# Patient Record
Sex: FEMALE | Race: WHITE | Employment: FULL TIME | URBAN - METROPOLITAN AREA
[De-identification: names, ages, dates, MRNs, and addresses within clinical notes are randomized per-mention and may not be internally consistent; named-entity substitution may affect disease eponyms.]

---

## 2018-02-27 ENCOUNTER — OFFICE VISIT (OUTPATIENT)
Dept: FAMILY MEDICINE CLINIC | Age: 58
End: 2018-02-27
Payer: COMMERCIAL

## 2018-02-27 VITALS
WEIGHT: 174 LBS | OXYGEN SATURATION: 98 % | DIASTOLIC BLOOD PRESSURE: 80 MMHG | HEART RATE: 87 BPM | BODY MASS INDEX: 36.53 KG/M2 | TEMPERATURE: 98.6 F | SYSTOLIC BLOOD PRESSURE: 130 MMHG | HEIGHT: 58 IN | RESPIRATION RATE: 16 BRPM

## 2018-02-27 DIAGNOSIS — M79.7 FIBROMYALGIA: ICD-10-CM

## 2018-02-27 DIAGNOSIS — Z86.39 HISTORY OF VITAMIN D DEFICIENCY: ICD-10-CM

## 2018-02-27 DIAGNOSIS — Z72.89 OTHER PROBLEMS RELATED TO LIFESTYLE: ICD-10-CM

## 2018-02-27 DIAGNOSIS — M79.642 HAND PAIN, LEFT: Primary | ICD-10-CM

## 2018-02-27 DIAGNOSIS — Z12.11 SCREENING FOR COLON CANCER: ICD-10-CM

## 2018-02-27 DIAGNOSIS — G90.50 RSD (REFLEX SYMPATHETIC DYSTROPHY): ICD-10-CM

## 2018-02-27 DIAGNOSIS — Z12.31 SCREENING MAMMOGRAM, ENCOUNTER FOR: ICD-10-CM

## 2018-02-27 DIAGNOSIS — Z13.1 ENCOUNTER FOR SCREENING FOR DIABETES MELLITUS: ICD-10-CM

## 2018-02-27 LAB
ALBUMIN SERPL-MCNC: 4.3 G/DL (ref 3.9–4.9)
ALP BLD-CCNC: 106 U/L (ref 40–130)
ALT SERPL-CCNC: 13 U/L (ref 0–33)
ANION GAP SERPL CALCULATED.3IONS-SCNC: 16 MEQ/L (ref 7–13)
AST SERPL-CCNC: 29 U/L (ref 0–35)
BASOPHILS ABSOLUTE: 0.1 K/UL (ref 0–0.2)
BASOPHILS RELATIVE PERCENT: 0.8 %
BILIRUB SERPL-MCNC: 0.5 MG/DL (ref 0–1.2)
BUN BLDV-MCNC: 10 MG/DL (ref 6–20)
CALCIUM SERPL-MCNC: 9.4 MG/DL (ref 8.6–10.2)
CHLORIDE BLD-SCNC: 100 MEQ/L (ref 98–107)
CHOLESTEROL, TOTAL: 177 MG/DL (ref 0–199)
CO2: 25 MEQ/L (ref 22–29)
CREAT SERPL-MCNC: 0.65 MG/DL (ref 0.5–0.9)
EOSINOPHILS ABSOLUTE: 0.2 K/UL (ref 0–0.7)
EOSINOPHILS RELATIVE PERCENT: 1.7 %
FOLATE: 14.4 NG/ML (ref 7.3–26.1)
GFR AFRICAN AMERICAN: >60
GFR NON-AFRICAN AMERICAN: >60
GLOBULIN: 2.5 G/DL (ref 2.3–3.5)
GLUCOSE BLD-MCNC: 94 MG/DL (ref 74–109)
HCT VFR BLD CALC: 42.6 % (ref 37–47)
HDLC SERPL-MCNC: 40 MG/DL (ref 40–59)
HEMOGLOBIN: 14.5 G/DL (ref 12–16)
LDL CHOLESTEROL CALCULATED: 78 MG/DL (ref 0–129)
LYMPHOCYTES ABSOLUTE: 2.5 K/UL (ref 1–4.8)
LYMPHOCYTES RELATIVE PERCENT: 26.4 %
MCH RBC QN AUTO: 32.9 PG (ref 27–31.3)
MCHC RBC AUTO-ENTMCNC: 34 % (ref 33–37)
MCV RBC AUTO: 96.7 FL (ref 82–100)
MONOCYTES ABSOLUTE: 0.5 K/UL (ref 0.2–0.8)
MONOCYTES RELATIVE PERCENT: 5.1 %
NEUTROPHILS ABSOLUTE: 6.3 K/UL (ref 1.4–6.5)
NEUTROPHILS RELATIVE PERCENT: 66 %
PDW BLD-RTO: 12.8 % (ref 11.5–14.5)
PLATELET # BLD: 207 K/UL (ref 130–400)
POTASSIUM SERPL-SCNC: 4.1 MEQ/L (ref 3.5–5.1)
RBC # BLD: 4.41 M/UL (ref 4.2–5.4)
SODIUM BLD-SCNC: 141 MEQ/L (ref 132–144)
TOTAL PROTEIN: 6.8 G/DL (ref 6.4–8.1)
TRIGL SERPL-MCNC: 294 MG/DL (ref 0–200)
TSH REFLEX: 1.27 UIU/ML (ref 0.27–4.2)
VITAMIN B-12: 451 PG/ML (ref 232–1245)
WBC # BLD: 9.6 K/UL (ref 4.8–10.8)

## 2018-02-27 PROCEDURE — 99203 OFFICE O/P NEW LOW 30 MIN: CPT | Performed by: FAMILY MEDICINE

## 2018-02-27 RX ORDER — DULOXETIN HYDROCHLORIDE 30 MG/1
30 CAPSULE, DELAYED RELEASE ORAL DAILY
Qty: 30 CAPSULE | Refills: 3 | Status: SHIPPED | OUTPATIENT
Start: 2018-02-27 | End: 2018-08-16 | Stop reason: ALTCHOICE

## 2018-02-27 RX ORDER — BUDESONIDE AND FORMOTEROL FUMARATE DIHYDRATE 160; 4.5 UG/1; UG/1
AEROSOL RESPIRATORY (INHALATION)
Refills: 0 | COMMUNITY
Start: 2018-02-01 | End: 2018-02-27 | Stop reason: ALTCHOICE

## 2018-02-27 ASSESSMENT — PATIENT HEALTH QUESTIONNAIRE - PHQ9
1. LITTLE INTEREST OR PLEASURE IN DOING THINGS: 0
2. FEELING DOWN, DEPRESSED OR HOPELESS: 0
SUM OF ALL RESPONSES TO PHQ QUESTIONS 1-9: 0
SUM OF ALL RESPONSES TO PHQ9 QUESTIONS 1 & 2: 0

## 2018-02-27 ASSESSMENT — ENCOUNTER SYMPTOMS
EYES NEGATIVE: 1
GASTROINTESTINAL NEGATIVE: 1
ALLERGIC/IMMUNOLOGIC NEGATIVE: 1
RESPIRATORY NEGATIVE: 1

## 2018-02-27 NOTE — PROGRESS NOTES
hand noted  Trophic changes. Also noted, but subtle. Only noted when compared to right hand. Lymphadenopathy:     She has no cervical adenopathy. She has no axillary adenopathy. Neurological: She is alert. She has normal strength. No cranial nerve deficit or sensory deficit. Coordination and gait normal.   Skin: Skin is warm, dry and intact. No rash noted. No erythema. Psychiatric: She has a normal mood and affect. Her speech is normal. Judgment and thought content normal. Cognition and memory are normal.            Assessment & Plan   1. Hand pain, left  XR HAND LEFT (MIN 3 VIEWS)   2. RSD (reflex sympathetic dystrophy)  CBC Auto Differential    Comprehensive Metabolic Panel    Lipid Panel    TSH ULTRASENSITIVE, DIRECTED    Vitamin B12 & Folate    DULoxetine (CYMBALTA) 30 MG extended release capsule   3. Fibromyalgia  Vitamin D 25 Hydrox, D2 & D3    Vitamin B12 & Folate    DULoxetine (CYMBALTA) 30 MG extended release capsule   4. Screening for colon cancer  [address] [place] [address] [facility], [address] [place] [address] Gastroenterology    POCT FECAL IMMUNOCHEMICAL TEST (FIT)   5. Other problems related to lifestyle  HIV-1,2 Combo Ag/Ab EIA with Reflex    Hepatitis C Antibody    CBC Auto Differential    Comprehensive Metabolic Panel    Lipid Panel    TSH ULTRASENSITIVE, DIRECTED   6. Encounter for screening for diabetes mellitus  Glucose, Fasting   7. Screening mammogram, encounter for  [facility] DIGITAL SCREEN W CAD BILATERAL   8.  History of vitamin D deficiency  Vitamin D 25 Hydrox, D2 & D3     Orders Placed This Encounter   Procedures    [facility] DIGITAL SCREEN W CAD BILATERAL     Standing Status:   Future     Standing Expiration Date:   4/29/2019    XR HAND LEFT (MIN 3 VIEWS)     Standing Status:   Future     Number of Occurrences:   1     Standing Expiration Date:   4/27/2018    Glucose, Fasting     Standing Status:   Future     Standing Expiration Date:   2/27/2019    HIV-1,2 Combo Ag/Ab EIA with Reflex     Standing completed    SYMBICORT 160-4.5 MCG/ACT AERO Therapy completed    VENTOLIN  (90 Base) MCG/ACT inhaler Therapy completed       Ready to quit: No  Counseling given: Yes      Return in about 1 month (around 3/27/2018).     Erlin Garcia DO

## 2018-03-03 LAB
VITAMIN D2 AND D3, TOTAL: 25.4 NG/ML (ref 30–80)
VITAMIN D2, 25 HYDROXY: 15.2 NG/ML
VITAMIN D3,25 HYDROXY: 10.2 NG/ML

## 2018-08-16 ENCOUNTER — OFFICE VISIT (OUTPATIENT)
Dept: FAMILY MEDICINE CLINIC | Age: 58
End: 2018-08-16
Payer: COMMERCIAL

## 2018-08-16 VITALS
OXYGEN SATURATION: 98 % | WEIGHT: 167 LBS | SYSTOLIC BLOOD PRESSURE: 120 MMHG | BODY MASS INDEX: 35.05 KG/M2 | HEIGHT: 58 IN | TEMPERATURE: 98.6 F | HEART RATE: 98 BPM | DIASTOLIC BLOOD PRESSURE: 84 MMHG | RESPIRATION RATE: 16 BRPM

## 2018-08-16 DIAGNOSIS — Z12.11 COLON CANCER SCREENING: ICD-10-CM

## 2018-08-16 DIAGNOSIS — Z12.31 VISIT FOR SCREENING MAMMOGRAM: ICD-10-CM

## 2018-08-16 DIAGNOSIS — S20.212D CONTUSION OF RIB ON LEFT SIDE, SUBSEQUENT ENCOUNTER: Primary | ICD-10-CM

## 2018-08-16 DIAGNOSIS — F17.200 TOBACCO USE DISORDER: ICD-10-CM

## 2018-08-16 PROCEDURE — 99214 OFFICE O/P EST MOD 30 MIN: CPT | Performed by: FAMILY MEDICINE

## 2018-08-16 ASSESSMENT — ENCOUNTER SYMPTOMS
GASTROINTESTINAL NEGATIVE: 1
RESPIRATORY NEGATIVE: 1
EYES NEGATIVE: 1
ALLERGIC/IMMUNOLOGIC NEGATIVE: 1

## 2018-08-16 NOTE — PROGRESS NOTES
back: Normal.        Thoracic back: Normal.        Lumbar back: Normal.        Left hand: She exhibits tenderness. Deformity: arthritic. Normal sensation noted. Decreased strength noted. Hypesthesia left hand noted  Trophic changes unchanged   Lymphadenopathy:     She has no cervical adenopathy. She has no axillary adenopathy. Neurological: She is alert. She has normal strength. No cranial nerve deficit or sensory deficit. Coordination and gait normal.   Skin: Skin is warm, dry and intact. No rash noted. No erythema. Psychiatric: She has a normal mood and affect. Her speech is normal. Judgment and thought content normal. Cognition and memory are normal.            Assessment & Plan    Diagnosis Orders   1. Contusion of rib on left side, subsequent encounter  XR RIBS LEFT INCLUDE CHEST (MIN 3 VIEWS)   2. Tobacco use disorder     3. Visit for screening mammogram  [facility] DIGITAL SCREEN W CAD BILATERAL   4. Colon cancer screening  Ambulatory referral to Gastroenterology     Orders Placed This Encounter   Procedures    [facility]I DIGITAL SCREEN W CAD BILATERAL     Standing Status:   Future     Standing Expiration Date:   10/16/2019    XR RIBS LEFT INCLUDE CHEST (MIN 3 VIEWS)     Standing Status:   Future     Number of Occurrences:   1     Standing Expiration Date:   8/16/2019    Ambulatory referral to Gastroenterology     Referral Priority:   Routine     Referral Type:   Consult for Advice and Opinion     Referral Reason:   Specialty Services Required     Referred to Provider:   Benn Paget, MD     Requested Specialty:   Gastroenterology     Number of Visits Requested:   1     No orders of the defined types were placed in this encounter. Medications Discontinued During This Encounter   Medication Reason    DULoxetine (CYMBALTA) 30 MG extended release capsule Therapy completed    aspirin 81 MG tablet Therapy completed   smoking cessation discussed  Stretching exercises for chest wall reviewed.  At this point would not recommend any further narcotic therapy. May use OTC anti-inflammatories, but would really recommend topical BenGay , Aspercreme, Biofreeze, etc. 4 times daily as needed along with daily stretching exercises until symptomatology resolved  Ready to quit: Not Answered  Counseling given: Yes      Return if symptoms worsen or fail to improve.     Trino Buckley, DO

## 2018-08-17 ENCOUNTER — TELEPHONE (OUTPATIENT)
Dept: FAMILY MEDICINE CLINIC | Age: 58
End: 2018-08-17

## 2019-04-16 ENCOUNTER — TELEPHONE (OUTPATIENT)
Dept: FAMILY MEDICINE CLINIC | Age: 59
End: 2019-04-16

## 2019-09-18 ENCOUNTER — OFFICE VISIT (OUTPATIENT)
Dept: FAMILY MEDICINE CLINIC | Age: 59
End: 2019-09-18
Payer: COMMERCIAL

## 2019-09-18 VITALS
SYSTOLIC BLOOD PRESSURE: 128 MMHG | HEART RATE: 91 BPM | BODY MASS INDEX: 38.46 KG/M2 | HEIGHT: 56 IN | DIASTOLIC BLOOD PRESSURE: 72 MMHG | TEMPERATURE: 98.2 F | OXYGEN SATURATION: 97 % | RESPIRATION RATE: 14 BRPM | WEIGHT: 171 LBS

## 2019-09-18 DIAGNOSIS — G90.50 RSD (REFLEX SYMPATHETIC DYSTROPHY): ICD-10-CM

## 2019-09-18 DIAGNOSIS — G90.50 RSD (REFLEX SYMPATHETIC DYSTROPHY): Primary | ICD-10-CM

## 2019-09-18 PROCEDURE — 99213 OFFICE O/P EST LOW 20 MIN: CPT | Performed by: FAMILY MEDICINE

## 2019-09-18 RX ORDER — AZITHROMYCIN 250 MG/1
250 TABLET, FILM COATED ORAL SEE ADMIN INSTRUCTIONS
Qty: 6 TABLET | Refills: 0 | Status: SHIPPED | OUTPATIENT
Start: 2019-09-18 | End: 2019-09-23

## 2019-09-18 RX ORDER — TRAMADOL HYDROCHLORIDE 100 MG/1
100 TABLET, EXTENDED RELEASE ORAL DAILY
Qty: 30 TABLET | Refills: 0 | Status: SHIPPED | OUTPATIENT
Start: 2019-09-18 | End: 2019-10-09 | Stop reason: SDUPTHER

## 2019-09-18 ASSESSMENT — ENCOUNTER SYMPTOMS
RESPIRATORY NEGATIVE: 1
CHEST TIGHTNESS: 0
RHINORRHEA: 0
COUGH: 0
EYES NEGATIVE: 1
GASTROINTESTINAL NEGATIVE: 1

## 2019-09-18 ASSESSMENT — PATIENT HEALTH QUESTIONNAIRE - PHQ9
1. LITTLE INTEREST OR PLEASURE IN DOING THINGS: 0
SUM OF ALL RESPONSES TO PHQ9 QUESTIONS 1 & 2: 0
SUM OF ALL RESPONSES TO PHQ QUESTIONS 1-9: 0
SUM OF ALL RESPONSES TO PHQ QUESTIONS 1-9: 0
2. FEELING DOWN, DEPRESSED OR HOPELESS: 0

## 2019-09-18 NOTE — PROGRESS NOTES
Patient is seen in follow up for   Chief Complaint   Patient presents with   Chase Godfrey Doctor     Patient here to establish care. Last PCP Dr. Kasie Angeles of left hand pain , x 3 years  Fracture in 2016 with air conditioner fell on it.  Health Maintenance     would like to hold off on all HM    Cough     x 4 days, productive cough with clearish yellow phelgm. HPIhere for follow up on chronic hand pain and rsd. Past Medical History:   Diagnosis Date    Asthma     Fibromyalgia     Hand fracture, left 2016    RSD (reflex sympathetic dystrophy)     Tobacco abuse      Patient Active Problem List    Diagnosis Date Noted    History of vitamin D deficiency 02/27/2018    RSD (reflex sympathetic dystrophy)     Fibromyalgia     Asthma     Tobacco use disorder      History reviewed. No pertinent surgical history. Family History   Problem Relation Age of Onset    Heart Disease Father     Cancer Father         melenoma    Cancer Mother         lung     Social History     Socioeconomic History    Marital status:      Spouse name: None    Number of children: None    Years of education: None    Highest education level: None   Occupational History    None   Social Needs    Financial resource strain: None    Food insecurity:     Worry: None     Inability: None    Transportation needs:     Medical: None     Non-medical: None   Tobacco Use    Smoking status: Current Every Day Smoker     Packs/day: 1.00     Years: 41.00     Pack years: 41.00     Types: Cigarettes     Start date: 2/27/1978    Smokeless tobacco: Never Used   Substance and Sexual Activity    Alcohol use:  Yes    Drug use: No    Sexual activity: None   Lifestyle    Physical activity:     Days per week: None     Minutes per session: None    Stress: None   Relationships    Social connections:     Talks on phone: None     Gets together: None     Attends [employer] service: None     Active member

## 2019-09-21 LAB
6-ACETYLMORPHINE: NOT DETECTED
7-AMINOCLONAZEPAM: NOT DETECTED
ALPHA-OH-ALPRAZOLAM: NOT DETECTED
ALPRAZOLAM: NOT DETECTED
AMPHETAMINE: NOT DETECTED
BARBITURATES: NOT DETECTED
BENZOYLECGONINE: NOT DETECTED
BUPRENORPHINE: NOT DETECTED
CARISOPRODOL: NOT DETECTED
CLONAZEPAM: NOT DETECTED
CODEINE: NOT DETECTED
CREATININE URINE: 205.7 MG/DL (ref 20–400)
DIAZEPAM: NOT DETECTED
EER PAIN MGT DRUG PANEL, HIGH RES/EMIT U: NORMAL
ETHYL GLUCURONIDE: PRESENT
FENTANYL: NOT DETECTED
HYDROCODONE: NOT DETECTED
HYDROMORPHONE: NOT DETECTED
LORAZEPAM: NOT DETECTED
MARIJUANA METABOLITE: NOT DETECTED
MDA: NOT DETECTED
MDEA: NOT DETECTED
MDMA URINE: NOT DETECTED
MEPERIDINE: NOT DETECTED
METHADONE: NOT DETECTED
METHAMPHETAMINE: NOT DETECTED
METHYLPHENIDATE: NOT DETECTED
MIDAZOLAM: NOT DETECTED
MORPHINE: NOT DETECTED
NORBUPRENORPHINE, FREE: NOT DETECTED
NORDIAZEPAM: NOT DETECTED
NORFENTANYL: NOT DETECTED
NORHYDROCODONE, URINE: NOT DETECTED
NOROXYCODONE: NOT DETECTED
NOROXYMORPHONE, URINE: NOT DETECTED
OXAZEPAM: NOT DETECTED
OXYCODONE: NOT DETECTED
OXYMORPHONE: NOT DETECTED
PAIN MANAGEMENT DRUG PANEL: NORMAL
PCP: NOT DETECTED
PHENTERMINE: NOT DETECTED
PROPOXYPHENE: NOT DETECTED
TAPENTADOL, URINE: NOT DETECTED
TAPENTADOL-O-SULFATE, URINE: NOT DETECTED
TEMAZEPAM: NOT DETECTED
TRAMADOL: NOT DETECTED
ZOLPIDEM: NOT DETECTED

## 2019-10-09 ENCOUNTER — OFFICE VISIT (OUTPATIENT)
Dept: FAMILY MEDICINE CLINIC | Age: 59
End: 2019-10-09
Payer: COMMERCIAL

## 2019-10-09 VITALS
SYSTOLIC BLOOD PRESSURE: 126 MMHG | HEART RATE: 80 BPM | OXYGEN SATURATION: 98 % | HEIGHT: 56 IN | TEMPERATURE: 97.9 F | WEIGHT: 166 LBS | BODY MASS INDEX: 37.34 KG/M2 | DIASTOLIC BLOOD PRESSURE: 70 MMHG | RESPIRATION RATE: 14 BRPM

## 2019-10-09 DIAGNOSIS — G90.50 RSD (REFLEX SYMPATHETIC DYSTROPHY): Primary | ICD-10-CM

## 2019-10-09 PROCEDURE — 99213 OFFICE O/P EST LOW 20 MIN: CPT | Performed by: FAMILY MEDICINE

## 2019-10-09 RX ORDER — TRAMADOL HYDROCHLORIDE 300 MG/1
300 TABLET, EXTENDED RELEASE ORAL DAILY
Qty: 30 TABLET | Refills: 2 | Status: SHIPPED | OUTPATIENT
Start: 2019-10-09 | End: 2019-12-19 | Stop reason: SDUPTHER

## 2019-10-09 ASSESSMENT — ENCOUNTER SYMPTOMS
GASTROINTESTINAL NEGATIVE: 1
RHINORRHEA: 0
COUGH: 0
RESPIRATORY NEGATIVE: 1
CHEST TIGHTNESS: 0
EYES NEGATIVE: 1

## 2019-10-23 ENCOUNTER — HOSPITAL ENCOUNTER (OUTPATIENT)
Dept: NEUROLOGY | Age: 59
Discharge: HOME | End: 2019-10-23
Payer: COMMERCIAL

## 2019-10-23 DIAGNOSIS — G90.50 RSD (REFLEX SYMPATHETIC DYSTROPHY): ICD-10-CM

## 2019-10-23 PROCEDURE — 95886 MUSC TEST DONE W/N TEST COMP: CPT

## 2019-10-23 PROCEDURE — 95910 NRV CNDJ TEST 7-8 STUDIES: CPT

## 2019-10-31 ENCOUNTER — OFFICE VISIT (OUTPATIENT)
Dept: FAMILY MEDICINE CLINIC | Age: 59
End: 2019-10-31
Payer: COMMERCIAL

## 2019-10-31 VITALS
WEIGHT: 166 LBS | DIASTOLIC BLOOD PRESSURE: 80 MMHG | RESPIRATION RATE: 16 BRPM | TEMPERATURE: 95.8 F | BODY MASS INDEX: 37.34 KG/M2 | HEART RATE: 76 BPM | OXYGEN SATURATION: 97 % | HEIGHT: 56 IN | SYSTOLIC BLOOD PRESSURE: 130 MMHG

## 2019-10-31 DIAGNOSIS — J40 BRONCHITIS: Primary | ICD-10-CM

## 2019-10-31 DIAGNOSIS — G90.50 RSD (REFLEX SYMPATHETIC DYSTROPHY): ICD-10-CM

## 2019-10-31 PROCEDURE — 99213 OFFICE O/P EST LOW 20 MIN: CPT | Performed by: FAMILY MEDICINE

## 2019-10-31 RX ORDER — PROMETHAZINE HYDROCHLORIDE AND CODEINE PHOSPHATE 6.25; 1 MG/5ML; MG/5ML
5 SYRUP ORAL EVERY 4 HOURS PRN
Qty: 240 ML | Refills: 1 | Status: SHIPPED | OUTPATIENT
Start: 2019-10-31 | End: 2020-02-11

## 2019-10-31 RX ORDER — CEFDINIR 300 MG/1
300 CAPSULE ORAL 2 TIMES DAILY
Qty: 20 CAPSULE | Refills: 0 | Status: SHIPPED | OUTPATIENT
Start: 2019-10-31 | End: 2019-11-10

## 2019-10-31 ASSESSMENT — ENCOUNTER SYMPTOMS
GASTROINTESTINAL NEGATIVE: 1
COUGH: 1
RHINORRHEA: 1
SORE THROAT: 1
EYES NEGATIVE: 1
CHEST TIGHTNESS: 0

## 2019-12-19 DIAGNOSIS — G90.50 RSD (REFLEX SYMPATHETIC DYSTROPHY): ICD-10-CM

## 2019-12-19 RX ORDER — TRAMADOL HYDROCHLORIDE 300 MG/1
300 TABLET, EXTENDED RELEASE ORAL DAILY
Qty: 30 TABLET | Refills: 0 | Status: SHIPPED | OUTPATIENT
Start: 2019-12-19 | End: 2020-01-06 | Stop reason: SDUPTHER

## 2020-01-02 ENCOUNTER — TELEPHONE (OUTPATIENT)
Dept: FAMILY MEDICINE CLINIC | Age: 60
End: 2020-01-02

## 2020-01-03 ENCOUNTER — TELEPHONE (OUTPATIENT)
Dept: FAMILY MEDICINE CLINIC | Age: 60
End: 2020-01-03

## 2020-01-06 ENCOUNTER — TELEPHONE (OUTPATIENT)
Dept: FAMILY MEDICINE CLINIC | Age: 60
End: 2020-01-06

## 2020-01-06 RX ORDER — TRAMADOL HYDROCHLORIDE 300 MG/1
300 TABLET, EXTENDED RELEASE ORAL DAILY
Qty: 30 TABLET | Refills: 0 | Status: SHIPPED | OUTPATIENT
Start: 2020-01-06 | End: 2020-02-11 | Stop reason: SDUPTHER

## 2020-02-11 ENCOUNTER — OFFICE VISIT (OUTPATIENT)
Dept: FAMILY MEDICINE CLINIC | Age: 60
End: 2020-02-11
Payer: COMMERCIAL

## 2020-02-11 VITALS
RESPIRATION RATE: 16 BRPM | OXYGEN SATURATION: 97 % | SYSTOLIC BLOOD PRESSURE: 128 MMHG | DIASTOLIC BLOOD PRESSURE: 84 MMHG | BODY MASS INDEX: 36.22 KG/M2 | HEART RATE: 85 BPM | HEIGHT: 56 IN | WEIGHT: 161 LBS | TEMPERATURE: 98 F

## 2020-02-11 PROCEDURE — 99213 OFFICE O/P EST LOW 20 MIN: CPT | Performed by: FAMILY MEDICINE

## 2020-02-11 RX ORDER — PROMETHAZINE HYDROCHLORIDE AND CODEINE PHOSPHATE 6.25; 1 MG/5ML; MG/5ML
5 SYRUP ORAL EVERY 4 HOURS PRN
Qty: 240 ML | Refills: 1 | Status: SHIPPED | OUTPATIENT
Start: 2020-02-11 | End: 2021-02-10

## 2020-02-11 RX ORDER — TRAMADOL HYDROCHLORIDE 300 MG/1
300 TABLET, EXTENDED RELEASE ORAL DAILY
Qty: 30 TABLET | Refills: 2 | Status: SHIPPED | OUTPATIENT
Start: 2020-02-11 | End: 2020-05-11

## 2020-02-11 ASSESSMENT — PATIENT HEALTH QUESTIONNAIRE - PHQ9
2. FEELING DOWN, DEPRESSED OR HOPELESS: 0
SUM OF ALL RESPONSES TO PHQ QUESTIONS 1-9: 0
SUM OF ALL RESPONSES TO PHQ9 QUESTIONS 1 & 2: 0
1. LITTLE INTEREST OR PLEASURE IN DOING THINGS: 0
SUM OF ALL RESPONSES TO PHQ QUESTIONS 1-9: 0

## 2020-02-11 ASSESSMENT — ENCOUNTER SYMPTOMS
RHINORRHEA: 0
COUGH: 0
RESPIRATORY NEGATIVE: 1
EYES NEGATIVE: 1
CHEST TIGHTNESS: 0
GASTROINTESTINAL NEGATIVE: 1

## 2020-02-11 NOTE — PROGRESS NOTES
Patient is seen in follow up for   Chief Complaint   Patient presents with    3 Month Follow-Up     Patient is here for 3 month follow up from RSD     [facility] for follow up on rsd doing well on current treatment. Past Medical History:   Diagnosis Date    Asthma     Fibromyalgia     Hand fracture, left 2016    RSD (reflex sympathetic dystrophy)     Tobacco abuse      Patient Active Problem List    Diagnosis Date Noted    History of vitamin D deficiency 02/27/2018    RSD (reflex sympathetic dystrophy)     Fibromyalgia     Asthma     Tobacco use disorder      No past surgical history on file. Family History   Problem Relation Age of Onset    Heart Disease Father     Cancer Father         melenoma    Cancer Mother         lung     Social History     Socioeconomic History    Marital status:      Spouse name: None    Number of children: None    Years of education: None    Highest education level: None   Occupational History    None   Social Needs    Financial resource strain: None    Food insecurity:     Worry: None     Inability: None    Transportation needs:     Medical: None     Non-medical: None   Tobacco Use    Smoking status: Current Every Day Smoker     Packs/day: 1.00     Years: 41.00     Pack years: 41.00     Types: Cigarettes     Start date: 2/27/1978    Smokeless tobacco: Never Used   Substance and Sexual Activity    Alcohol use:  Yes    Drug use: No    Sexual activity: None   Lifestyle    Physical activity:     Days per week: None     Minutes per session: None    Stress: None   Relationships    Social connections:     Talks on phone: None     Gets together: None     Attends Buddhist service: None     Active member of club or organization: None     Attends meetings of clubs or organizations: None     Relationship status: None    Intimate partner violence:     Fear of current or ex partner: None     Emotionally abused: None     Physically abused: None     Forced sexual activity: None   Other Topics Concern    None   Social History Narrative    None     Current Outpatient Medications   Medication Sig Dispense Refill    traMADol (ULTRAM ER) 300 MG extended release tablet Take 1 tablet by mouth daily for 90 days. 30 tablet 2    promethazine-codeine (PHENERGAN WITH CODEINE) 6.25-10 MG/5ML syrup Take 5 mLs by mouth every 4 hours as needed for Cough. 240 mL 1     No current facility-administered medications for this visit. No current outpatient medications on file prior to visit. No current facility-administered medications on file prior to visit. No Known Allergies  Health Maintenance   Topic Date Due    Hepatitis C screen  1960    Pneumococcal 0-64 years Vaccine (1 of 1 - PPSV23) 07/20/1965    DTaP/Tdap/Td vaccine (1 - Tdap) 07/20/1970    HIV screen  07/20/1974    Cervical cancer screen  07/20/1980    Breast cancer screen  07/20/2009    Shingles Vaccine (1 of 2) 07/20/2009    Colon cancer screen colonoscopy  07/20/2009    Low dose CT lung screening  07/20/2014    Flu vaccine (1) 09/01/2019    Lipid screen  02/27/2023    Hepatitis A vaccine  Aged Out    Hepatitis B vaccine  Aged Out    Hib vaccine  Aged Out    Meningococcal (ACWY) vaccine  Aged Out       Review of Systems     Review of Systems   Constitutional: Negative for activity change, appetite change, fatigue and fever. HENT: Negative for congestion and rhinorrhea. Eyes: Negative. Respiratory: Negative. Negative for cough and chest tightness. Cardiovascular: Negative. Gastrointestinal: Negative. Endocrine: Negative. Genitourinary: Negative. Musculoskeletal: Positive for arthralgias, joint swelling and myalgias. Skin: Negative. Neurological: Negative for dizziness, light-headedness and numbness. Hematological: Negative. Psychiatric/Behavioral: Negative.         Physical Exam  Vitals:    02/11/20 1642   BP: 128/84   Site: Left Upper Arm   Position: Sitting Cuff Size: Medium Adult   Pulse: 85   Resp: 16   Temp: 98 °F (36.7 °C)   TempSrc: Oral   SpO2: 97%   Weight: 161 lb (73 kg)   Height: 4' 8\" (1.422 m)       Physical Exam  Constitutional:       Appearance: She is well-developed. HENT:      Right Ear: External ear normal.      Left Ear: External ear normal.   Eyes:      Pupils: Pupils are equal, round, and reactive to light. Neck:      Musculoskeletal: Normal range of motion and neck supple. Thyroid: No thyromegaly. Cardiovascular:      Rate and Rhythm: Normal rate and regular rhythm. Heart sounds: Normal heart sounds. No murmur. No friction rub. No gallop. Pulmonary:      Effort: Pulmonary effort is normal. No respiratory distress. Breath sounds: No wheezing or rales. Chest:      Chest wall: No tenderness. Abdominal:      General: Bowel sounds are normal. There is no distension. Palpations: Abdomen is soft. There is no mass. Tenderness: There is no abdominal tenderness. There is no guarding or rebound. Musculoskeletal: Normal range of motion. Lymphadenopathy:      Cervical: No cervical adenopathy. Skin:     General: Skin is warm and dry. Neurological:      Mental Status: She is alert and oriented to person, place, and time. Cranial Nerves: No cranial nerve deficit. Coordination: Coordination normal.         Assessment   Diagnosis Orders   1. RSD (reflex sympathetic dystrophy)  traMADol (ULTRAM ER) 300 MG extended release tablet   2. Bronchitis  promethazine-codeine (PHENERGAN WITH CODEINE) 6.25-10 MG/5ML syrup     Problem List     RSD (reflex sympathetic dystrophy)    Relevant Medications    traMADol (ULTRAM ER) 300 MG extended release tablet          Plan  No orders of the defined types were placed in this encounter. Orders Placed This Encounter   Medications    traMADol (ULTRAM ER) 300 MG extended release tablet     Sig: Take 1 tablet by mouth daily for 90 days.      Dispense:  30 tablet     Refill:  2

## 2020-04-29 ENCOUNTER — TELEPHONE (OUTPATIENT)
Dept: FAMILY MEDICINE CLINIC | Age: 60
End: 2020-04-29

## 2020-04-29 NOTE — TELEPHONE ENCOUNTER
Patent's  called and stated that Dr. Ashley Jones has treated his wife, Martha Lara, in the past for an ongoing cough. He is wondering if Dr. Ashley Jones can write a referral for them to go to a pulmonary doctor at the [facility]. He can't remember the provider's name, but he is someone that has helped her father in the past.  She isn't sure if her insurance will require a referral from a doctor, so he's requesting one.       He may be reached at [phone]

## 2020-07-28 ENCOUNTER — TELEPHONE (OUTPATIENT)
Dept: FAMILY MEDICINE CLINIC | Age: 60
End: 2020-07-28

## 2020-09-21 ENCOUNTER — TELEPHONE (OUTPATIENT)
Dept: FAMILY MEDICINE CLINIC | Age: 60
End: 2020-09-21

## 2020-09-21 NOTE — TELEPHONE ENCOUNTER
called because patient had an MRI of her left arm on 9/18. That arm has nerve damage. She got the MRI at an imaging center. Patient has a disc with results. Patient is not sure what she should do. Are you able to read the results or refer her to a specialist?  Please advise.

## 2020-09-21 NOTE — TELEPHONE ENCOUNTER
Spoke to Medhat Klein who said it was not ordered by any one. They just went and had it done because she was having issues and no one would order it. Told him we can not view disc's but if he could get the report we could look at it then.

## 2020-11-16 ENCOUNTER — TELEPHONE (OUTPATIENT)
Dept: FAMILY MEDICINE CLINIC | Age: 60
End: 2020-11-16

## 2023-01-23 ENCOUNTER — OFFICE VISIT (OUTPATIENT)
Dept: FAMILY MEDICINE CLINIC | Age: 63
End: 2023-01-23
Payer: COMMERCIAL

## 2023-01-23 VITALS
DIASTOLIC BLOOD PRESSURE: 78 MMHG | TEMPERATURE: 97.6 F | RESPIRATION RATE: 15 BRPM | HEIGHT: 56 IN | OXYGEN SATURATION: 97 % | SYSTOLIC BLOOD PRESSURE: 130 MMHG | HEART RATE: 81 BPM | BODY MASS INDEX: 36.1 KG/M2

## 2023-01-23 DIAGNOSIS — R53.83 OTHER FATIGUE: ICD-10-CM

## 2023-01-23 DIAGNOSIS — R13.19 ESOPHAGEAL DYSPHAGIA: ICD-10-CM

## 2023-01-23 DIAGNOSIS — M79.7 FIBROMYALGIA: Primary | ICD-10-CM

## 2023-01-23 LAB
ALBUMIN SERPL-MCNC: 4.1 G/DL (ref 3.5–4.6)
ALP BLD-CCNC: 118 U/L (ref 40–130)
ALT SERPL-CCNC: 8 U/L (ref 0–33)
ANION GAP SERPL CALCULATED.3IONS-SCNC: 13 MEQ/L (ref 9–15)
AST SERPL-CCNC: 17 U/L (ref 0–35)
BASOPHILS ABSOLUTE: 0.1 K/UL (ref 0–0.2)
BASOPHILS RELATIVE PERCENT: 0.9 %
BILIRUB SERPL-MCNC: 0.3 MG/DL (ref 0.2–0.7)
BUN BLDV-MCNC: 17 MG/DL (ref 8–23)
CALCIUM SERPL-MCNC: 9.1 MG/DL (ref 8.5–9.9)
CHLORIDE BLD-SCNC: 104 MEQ/L (ref 95–107)
CO2: 23 MEQ/L (ref 20–31)
CREAT SERPL-MCNC: 1.1 MG/DL (ref 0.5–0.9)
EOSINOPHILS ABSOLUTE: 0.2 K/UL (ref 0–0.7)
EOSINOPHILS RELATIVE PERCENT: 1.6 %
GFR SERPL CREATININE-BSD FRML MDRD: 56.3 ML/MIN/{1.73_M2}
GLOBULIN: 2.8 G/DL (ref 2.3–3.5)
GLUCOSE BLD-MCNC: 108 MG/DL (ref 70–99)
HCT VFR BLD CALC: 45 % (ref 37–47)
HEMOGLOBIN: 15.2 G/DL (ref 12–16)
LIPASE: 28 U/L (ref 12–95)
LYMPHOCYTES ABSOLUTE: 2.2 K/UL (ref 1–4.8)
LYMPHOCYTES RELATIVE PERCENT: 20.9 %
MCH RBC QN AUTO: 32.9 PG (ref 27–31.3)
MCHC RBC AUTO-ENTMCNC: 33.8 % (ref 33–37)
MCV RBC AUTO: 97.3 FL (ref 79.4–94.8)
MONOCYTES ABSOLUTE: 0.7 K/UL (ref 0.2–0.8)
MONOCYTES RELATIVE PERCENT: 6.3 %
NEUTROPHILS ABSOLUTE: 7.3 K/UL (ref 1.4–6.5)
NEUTROPHILS RELATIVE PERCENT: 70.3 %
PDW BLD-RTO: 13 % (ref 11.5–14.5)
PLATELET # BLD: 244 K/UL (ref 130–400)
POTASSIUM SERPL-SCNC: 3.8 MEQ/L (ref 3.4–4.9)
RBC # BLD: 4.62 M/UL (ref 4.2–5.4)
SEDIMENTATION RATE, ERYTHROCYTE: 10 MM (ref 0–30)
SODIUM BLD-SCNC: 140 MEQ/L (ref 135–144)
TOTAL PROTEIN: 6.9 G/DL (ref 6.3–8)
TSH SERPL DL<=0.05 MIU/L-ACNC: 1 UIU/ML (ref 0.44–3.86)
WBC # BLD: 10.4 K/UL (ref 4.8–10.8)

## 2023-01-23 PROCEDURE — G8484 FLU IMMUNIZE NO ADMIN: HCPCS | Performed by: FAMILY MEDICINE

## 2023-01-23 PROCEDURE — 99213 OFFICE O/P EST LOW 20 MIN: CPT | Performed by: FAMILY MEDICINE

## 2023-01-23 PROCEDURE — 4004F PT TOBACCO SCREEN RCVD TLK: CPT | Performed by: FAMILY MEDICINE

## 2023-01-23 PROCEDURE — 3017F COLORECTAL CA SCREEN DOC REV: CPT | Performed by: FAMILY MEDICINE

## 2023-01-23 PROCEDURE — G8417 CALC BMI ABV UP PARAM F/U: HCPCS | Performed by: FAMILY MEDICINE

## 2023-01-23 PROCEDURE — G8427 DOCREV CUR MEDS BY ELIG CLIN: HCPCS | Performed by: FAMILY MEDICINE

## 2023-01-23 SDOH — ECONOMIC STABILITY: FOOD INSECURITY: WITHIN THE PAST 12 MONTHS, THE FOOD YOU BOUGHT JUST DIDN'T LAST AND YOU DIDN'T HAVE MONEY TO GET MORE.: NEVER TRUE

## 2023-01-23 SDOH — ECONOMIC STABILITY: FOOD INSECURITY: WITHIN THE PAST 12 MONTHS, YOU WORRIED THAT YOUR FOOD WOULD RUN OUT BEFORE YOU GOT MONEY TO BUY MORE.: NEVER TRUE

## 2023-01-23 ASSESSMENT — PATIENT HEALTH QUESTIONNAIRE - PHQ9
2. FEELING DOWN, DEPRESSED OR HOPELESS: 0
SUM OF ALL RESPONSES TO PHQ QUESTIONS 1-9: 0
SUM OF ALL RESPONSES TO PHQ9 QUESTIONS 1 & 2: 0
SUM OF ALL RESPONSES TO PHQ QUESTIONS 1-9: 0
1. LITTLE INTEREST OR PLEASURE IN DOING THINGS: 0

## 2023-01-23 ASSESSMENT — ENCOUNTER SYMPTOMS
RESPIRATORY NEGATIVE: 1
EYES NEGATIVE: 1
COUGH: 0
CHEST TIGHTNESS: 0
DIARRHEA: 1
VOMITING: 1
RHINORRHEA: 0

## 2023-01-23 ASSESSMENT — SOCIAL DETERMINANTS OF HEALTH (SDOH): HOW HARD IS IT FOR YOU TO PAY FOR THE VERY BASICS LIKE FOOD, HOUSING, MEDICAL CARE, AND HEATING?: NOT HARD AT ALL

## 2023-01-23 NOTE — PROGRESS NOTES
Patient is seen in follow up for   Chief Complaint   Patient presents with    Fatigue    Diarrhea    Emesis     For months and having trouble swallowing     Fatigue  Associated symptoms include fatigue and vomiting. Pertinent negatives include no congestion, coughing, fever or numbness. Diarrhea   Associated symptoms include vomiting. Pertinent negatives include no coughing or fever. Emesis   Associated symptoms include diarrhea. Pertinent negatives include no coughing, dizziness or fever. emesis after things getting stuck in her esophogus. Past Medical History:   Diagnosis Date    Asthma     Fibromyalgia     Hand fracture, left 2016    RSD (reflex sympathetic dystrophy)     Tobacco abuse      Patient Active Problem List    Diagnosis Date Noted    History of vitamin D deficiency 02/27/2018    RSD (reflex sympathetic dystrophy)     Fibromyalgia     Asthma     Tobacco use disorder      No past surgical history on file. Family History   Problem Relation Age of Onset    Heart Disease Father     Cancer Father         melenoma    Cancer Mother         lung     Social History     Socioeconomic History    Marital status:      Spouse name: None    Number of children: None    Years of education: None    Highest education level: None   Tobacco Use    Smoking status: Every Day     Packs/day: 1.00     Years: 41.00     Pack years: 41.00     Types: Cigarettes     Start date: 2/27/1978    Smokeless tobacco: Never   Substance and Sexual Activity    Alcohol use: Yes    Drug use: No     Social Determinants of Health     Financial Resource Strain: Low Risk     Difficulty of Paying Living Expenses: Not hard at all   Food Insecurity: No Food Insecurity    Worried About Running Out of Food in the Last Year: Never true    Ran Out of Food in the Last Year: Never true     No current outpatient medications on file. No current facility-administered medications for this visit.      No current outpatient medications on file prior to visit. No current facility-administered medications on file prior to visit. No Known Allergies  Health Maintenance   Topic Date Due    Pneumococcal 0-64 years Vaccine (1 - PCV) Never done    Depression Screen  Never done    HIV screen  Never done    Hepatitis C screen  Never done    DTaP/Tdap/Td vaccine (1 - Tdap) Never done    Cervical cancer screen  Never done    Colorectal Cancer Screen  Never done    Breast cancer screen  Never done    Shingles vaccine (1 of 2) Never done    Low dose CT lung screening  Never done    COVID-19 Vaccine (4 - Booster for Pfizer series) 03/18/2022    Flu vaccine (1) Never done    Lipids  02/27/2023    Hepatitis A vaccine  Aged Out    Hib vaccine  Aged Out    Meningococcal (ACWY) vaccine  Aged Out       Review of Systems     Review of Systems   Constitutional:  Positive for fatigue. Negative for activity change, appetite change and fever. HENT:  Negative for congestion and rhinorrhea. Eyes: Negative. Respiratory: Negative. Negative for cough and chest tightness. Cardiovascular: Negative. Gastrointestinal:  Positive for diarrhea and vomiting. Endocrine: Negative. Genitourinary: Negative. Musculoskeletal: Negative. Skin: Negative. Neurological:  Negative for dizziness, light-headedness and numbness. Hematological: Negative. Psychiatric/Behavioral: Negative. Physical Exam  Vitals:    01/23/23 1500   BP: 130/78   Pulse: 81   Resp: 15   Temp: 97.6 °F (36.4 °C)   SpO2: 97%   Height: 4' 8\" (1.422 m)       Physical Exam  Constitutional:       Appearance: She is well-developed. HENT:      Right Ear: External ear normal.      Left Ear: External ear normal.   Eyes:      Pupils: Pupils are equal, round, and reactive to light. Neck:      Thyroid: No thyromegaly. Cardiovascular:      Rate and Rhythm: Normal rate and regular rhythm. Heart sounds: Normal heart sounds. No murmur heard. No friction rub. No gallop.    Pulmonary: Effort: Pulmonary effort is normal. No respiratory distress. Breath sounds: No wheezing or rales. Chest:      Chest wall: No tenderness. Abdominal:      General: Bowel sounds are normal. There is no distension. Palpations: Abdomen is soft. There is no mass. Tenderness: There is no abdominal tenderness. There is no guarding or rebound. Musculoskeletal:         General: Normal range of motion. Cervical back: Normal range of motion and neck supple. Lymphadenopathy:      Cervical: No cervical adenopathy. Skin:     General: Skin is warm and dry. Neurological:      Mental Status: She is alert and oriented to person, place, and time. Cranial Nerves: No cranial nerve deficit. Coordination: Coordination normal.       Assessment   Diagnosis Orders   1. Fibromyalgia        2. Esophageal dysphagia  Ambulatory referral to Gastroenterology    Lipase      3. Other fatigue  Comprehensive Metabolic Panel    CBC with Auto Differential    Sedimentation Rate    TSH        Problem List       Fibromyalgia - Primary       Plan  Orders Placed This Encounter   Procedures    Comprehensive Metabolic Panel     Standing Status:   Future     Standing Expiration Date:   1/23/2024    CBC with Auto Differential     Standing Status:   Future     Standing Expiration Date:   1/23/2024    Sedimentation Rate     Standing Status:   Future     Standing Expiration Date:   1/23/2024    TSH     Standing Status:   Future     Standing Expiration Date:   1/23/2024    Lipase     Standing Status:   Future     Standing Expiration Date:   1/23/2024    Ambulatory referral to Gastroenterology     Referral Priority:   Routine     Referral Type:   Eval and Treat     Referral Reason:   Specialty Services Required     Referred to Provider:   Gena Stanley MD     Requested Specialty:   Gastroenterology     Number of Visits Requested:   1     No orders of the defined types were placed in this encounter.     No follow-ups on file.  Kerri Carrasco MD

## 2023-02-02 ENCOUNTER — PREP FOR PROCEDURE (OUTPATIENT)
Dept: GASTROENTEROLOGY | Age: 63
End: 2023-02-02

## 2023-02-02 ENCOUNTER — OFFICE VISIT (OUTPATIENT)
Dept: GASTROENTEROLOGY | Age: 63
End: 2023-02-02
Payer: COMMERCIAL

## 2023-02-02 VITALS — HEART RATE: 78 BPM | WEIGHT: 161 LBS | OXYGEN SATURATION: 100 % | BODY MASS INDEX: 36.22 KG/M2 | HEIGHT: 56 IN

## 2023-02-02 DIAGNOSIS — R13.19 ESOPHAGEAL DYSPHAGIA: Primary | ICD-10-CM

## 2023-02-02 DIAGNOSIS — R19.7 DIARRHEA, UNSPECIFIED TYPE: ICD-10-CM

## 2023-02-02 PROCEDURE — G8417 CALC BMI ABV UP PARAM F/U: HCPCS | Performed by: INTERNAL MEDICINE

## 2023-02-02 PROCEDURE — 99204 OFFICE O/P NEW MOD 45 MIN: CPT | Performed by: INTERNAL MEDICINE

## 2023-02-02 PROCEDURE — G8427 DOCREV CUR MEDS BY ELIG CLIN: HCPCS | Performed by: INTERNAL MEDICINE

## 2023-02-02 PROCEDURE — G8484 FLU IMMUNIZE NO ADMIN: HCPCS | Performed by: INTERNAL MEDICINE

## 2023-02-02 PROCEDURE — 3017F COLORECTAL CA SCREEN DOC REV: CPT | Performed by: INTERNAL MEDICINE

## 2023-02-02 PROCEDURE — 4004F PT TOBACCO SCREEN RCVD TLK: CPT | Performed by: INTERNAL MEDICINE

## 2023-02-02 RX ORDER — SODIUM, POTASSIUM,MAG SULFATES 17.5-3.13G
SOLUTION, RECONSTITUTED, ORAL ORAL
Qty: 354 ML | Refills: 0 | Status: SHIPPED | OUTPATIENT
Start: 2023-02-02

## 2023-02-02 NOTE — PROGRESS NOTES
Gastroenterology Clinic Visit    Marlena Zamora  [id number]  Chief Complaint   Patient presents with    New Patient     HPI: 60 y.o. female presents to the clinic with symptoms of dysphagia that started about 5 to 6 months ago, difficulty mainly with solids, not with liquids. Denies any reflux symptoms, denies being on PPI therapy or H2 blocker therapy. Denies any weight loss or weight gain. Denies any hematemesis or melena. Patient reports almost complete resolution of dysphagia symptoms over the last 2 weeks. Additionally reports having loose bowel movements about 3-4 times a day, denies any blood in the stool. No abdominal cramping or pain. On further questioning does endorse smoking 1 pack of cigarettes on a daily basis  Drinks 3-4 drinks 3-4 times a week, denies any other drug use    Previous GI work up/Endoscopic investigations: No endoscopy previously    Review of Systems   All other systems reviewed and are negative. Past Medical History:   Diagnosis Date    Asthma     Fibromyalgia     Hand fracture, left 2016    RSD (reflex sympathetic dystrophy)     Tobacco abuse    No past surgical history on file. No current outpatient medications on file prior to visit. No current facility-administered medications on file prior to visit.      Family History   Problem Relation Age of Onset    Cancer Mother         lung    Heart Disease Father     Cancer Father         melenoma    Colon Cancer Neg Hx      Social History     Socioeconomic History    Marital status:    Tobacco Use    Smoking status: Every Day     Packs/day: 1.00     Years: 41.00     Pack years: 41.00     Types: Cigarettes     Start date: 2/27/1978    Smokeless tobacco: Never   Substance and Sexual Activity    Alcohol use: Yes    Drug use: No     Social Determinants of Health     Financial Resource Strain: Low Risk     Difficulty of Paying Living Expenses: Not hard at all   Food Insecurity: No Food Insecurity    Worried About Running Out of Food in the Last Year: Never true    Ran Out of Food in the Last Year: Never true     Pulse 78, height 4' 8\" (1.422 m), weight 161 lb (73 kg), last menstrual period 02/27/1993, SpO2 100 %. Physical Exam  Constitutional:       General: She is not in acute distress. Appearance: Normal appearance. She is well-developed. Comments: Central and generalized obesity   Eyes:      General: No scleral icterus. Cardiovascular:      Rate and Rhythm: Normal rate and regular rhythm. Pulmonary:      Effort: Pulmonary effort is normal.      Breath sounds: Normal breath sounds. Abdominal:      General: Bowel sounds are normal. There is no distension. Palpations: Abdomen is soft. There is no mass. Tenderness: There is no abdominal tenderness. There is no guarding or rebound. Musculoskeletal:         General: Normal range of motion. Lymphadenopathy:      Cervical: No cervical adenopathy. Neurological:      Mental Status: She is alert and oriented to person, place, and time. Psychiatric:         Behavior: Behavior normal.         Thought Content: Thought content normal.         Judgment: Judgment normal.     Laboratory, Pathology, Radiology reviewed indetail with relevant important investigations summarized below:  Lab Results   Component Value Date    WBC 10.4 01/23/2023    HGB 15.2 01/23/2023    HCT 45.0 01/23/2023    MCV 97.3 (H) 01/23/2023     01/23/2023     No results found for: IRON, TIBC, FERRITIN  Lab Results   Component Value Date    SINSVCFT21 776 02/27/2018      Lab Results   Component Value Date    FOLATE 14.4 02/27/2018     Lab Results   Component Value Date    LABALBU 4.1 01/23/2023      Lab Results   Component Value Date    ALT 8 01/23/2023    AST 17 01/23/2023    ALKPHOS 118 01/23/2023    BILITOT 0.3 01/23/2023     CT Result (most recent):  No results found for this or any previous visit from the past 3650 days.      Assessment and Plan:  60 y.o. female with symptoms of dysphagia that have resolved 2 weeks ago, symptoms lasted for about 5 to 6 months, unclear etiology given patient denies any reflux symptoms, not on any PPI or H2 blocker therapy. No weight gain or weight loss. Additionally with symptoms of longstanding diarrhea, suspect this is related to diet/increased alcohol intake. Recommend further evaluation with stool studies and endoscopic evaluation. 1. Esophageal dysphagia  -Proceed with upper endoscopy to evaluate further  -Will defer starting PPI or H2 blocker therapy until completion of upper endoscopy    2. Diarrhea, unspecified type  - Calprotectin Stool; Future  - pH, Stool; Future  -Colonoscopy with random colon biopsies to evaluate further, procedure discussed at length, including the risks and benefits. Split prep was prescribed and discussed. Importance of the prep in ensuring a good exam was emphasized. -Suprep    Return in about 2 months (around 4/2/2023). Montserrat Hughes MD   Staff Gastroenterologist  [facility]    Please note this report has been partially produced using speech recognition software and may cause or contain errors related to thatsystem including grammar, punctuation and spelling as well as words and phrases that may seem inappropriate. If there are questions or concerns please feel free to contact me to clarify.

## 2023-04-05 ENCOUNTER — ANESTHESIA EVENT (OUTPATIENT)
Dept: ENDOSCOPY | Age: 63
End: 2023-04-05
Payer: COMMERCIAL

## 2023-04-06 ENCOUNTER — ANESTHESIA (OUTPATIENT)
Dept: ENDOSCOPY | Age: 63
End: 2023-04-06
Payer: COMMERCIAL

## 2023-04-06 ENCOUNTER — HOSPITAL ENCOUNTER (OUTPATIENT)
Age: 63
Setting detail: OUTPATIENT SURGERY
Discharge: HOME | End: 2023-04-06
Attending: INTERNAL MEDICINE | Admitting: INTERNAL MEDICINE
Payer: COMMERCIAL

## 2023-04-06 VITALS
TEMPERATURE: 97.8 F | DIASTOLIC BLOOD PRESSURE: 65 MMHG | OXYGEN SATURATION: 97 % | SYSTOLIC BLOOD PRESSURE: 133 MMHG | RESPIRATION RATE: 16 BRPM | BODY MASS INDEX: 35.99 KG/M2 | HEIGHT: 56 IN | WEIGHT: 160 LBS | HEART RATE: 85 BPM

## 2023-04-06 DIAGNOSIS — R19.7 DIARRHEA: ICD-10-CM

## 2023-04-06 DIAGNOSIS — R13.19 ESOPHAGEAL DYSPHAGIA: ICD-10-CM

## 2023-04-06 PROCEDURE — 2709999900 HC NON-CHARGEABLE SUPPLY: Performed by: INTERNAL MEDICINE

## 2023-04-06 PROCEDURE — 6370000000 HC RX 637 (ALT 250 FOR IP): Performed by: INTERNAL MEDICINE

## 2023-04-06 PROCEDURE — 3700000001 HC ADD 15 MINUTES (ANESTHESIA): Performed by: INTERNAL MEDICINE

## 2023-04-06 PROCEDURE — 6360000002 HC RX W HCPCS: Performed by: REGISTERED NURSE

## 2023-04-06 PROCEDURE — 7100000011 HC PHASE II RECOVERY - ADDTL 15 MIN: Performed by: INTERNAL MEDICINE

## 2023-04-06 PROCEDURE — 3700000000 HC ANESTHESIA ATTENDED CARE: Performed by: INTERNAL MEDICINE

## 2023-04-06 PROCEDURE — 3609017100 HC EGD: Performed by: INTERNAL MEDICINE

## 2023-04-06 PROCEDURE — 2500000003 HC RX 250 WO HCPCS: Performed by: REGISTERED NURSE

## 2023-04-06 PROCEDURE — 3609027000 HC COLONOSCOPY: Performed by: INTERNAL MEDICINE

## 2023-04-06 PROCEDURE — 2580000003 HC RX 258

## 2023-04-06 PROCEDURE — 7100000010 HC PHASE II RECOVERY - FIRST 15 MIN: Performed by: INTERNAL MEDICINE

## 2023-04-06 PROCEDURE — 2580000003 HC RX 258: Performed by: INTERNAL MEDICINE

## 2023-04-06 RX ORDER — SODIUM CHLORIDE 9 MG/ML
INJECTION, SOLUTION INTRAVENOUS
Status: COMPLETED
Start: 2023-04-06 | End: 2023-04-06

## 2023-04-06 RX ORDER — SODIUM CHLORIDE 9 MG/ML
INJECTION, SOLUTION INTRAVENOUS
Status: DISCONTINUED
Start: 2023-04-06 | End: 2023-04-06 | Stop reason: HOSPADM

## 2023-04-06 RX ORDER — SIMETHICONE 20 MG/.3ML
EMULSION ORAL PRN
Status: DISCONTINUED | OUTPATIENT
Start: 2023-04-06 | End: 2023-04-06 | Stop reason: ALTCHOICE

## 2023-04-06 RX ORDER — GLYCOPYRROLATE 1 MG/5 ML
SYRINGE (ML) INTRAVENOUS PRN
Status: DISCONTINUED | OUTPATIENT
Start: 2023-04-06 | End: 2023-04-06 | Stop reason: SDUPTHER

## 2023-04-06 RX ORDER — OMEPRAZOLE 40 MG/1
40 CAPSULE, DELAYED RELEASE ORAL DAILY
Qty: 30 CAPSULE | Refills: 3 | Status: SHIPPED | OUTPATIENT
Start: 2023-04-06

## 2023-04-06 RX ORDER — LIDOCAINE HYDROCHLORIDE 20 MG/ML
INJECTION, SOLUTION INFILTRATION; PERINEURAL PRN
Status: DISCONTINUED | OUTPATIENT
Start: 2023-04-06 | End: 2023-04-06 | Stop reason: SDUPTHER

## 2023-04-06 RX ORDER — PROPOFOL 10 MG/ML
INJECTION, EMULSION INTRAVENOUS PRN
Status: DISCONTINUED | OUTPATIENT
Start: 2023-04-06 | End: 2023-04-06 | Stop reason: SDUPTHER

## 2023-04-06 RX ORDER — MAGNESIUM HYDROXIDE 1200 MG/15ML
LIQUID ORAL PRN
Status: DISCONTINUED | OUTPATIENT
Start: 2023-04-06 | End: 2023-04-06 | Stop reason: ALTCHOICE

## 2023-04-06 RX ORDER — SODIUM CHLORIDE 9 MG/ML
INJECTION, SOLUTION INTRAVENOUS CONTINUOUS
Status: DISCONTINUED | OUTPATIENT
Start: 2023-04-06 | End: 2023-04-06 | Stop reason: HOSPADM

## 2023-04-06 RX ADMIN — SODIUM CHLORIDE: 9 INJECTION, SOLUTION INTRAVENOUS at 10:06

## 2023-04-06 RX ADMIN — PROPOFOL 50 MG: 10 INJECTION, EMULSION INTRAVENOUS at 10:44

## 2023-04-06 RX ADMIN — PROPOFOL 50 MG: 10 INJECTION, EMULSION INTRAVENOUS at 10:51

## 2023-04-06 RX ADMIN — LIDOCAINE HYDROCHLORIDE 100 MG: 20 INJECTION, SOLUTION INFILTRATION; PERINEURAL at 10:30

## 2023-04-06 RX ADMIN — PROPOFOL 50 MG: 10 INJECTION, EMULSION INTRAVENOUS at 10:40

## 2023-04-06 RX ADMIN — PROPOFOL 100 MG: 10 INJECTION, EMULSION INTRAVENOUS at 10:38

## 2023-04-06 RX ADMIN — Medication 0.2 MG: at 10:30

## 2023-04-06 RX ADMIN — PROPOFOL 50 MG: 10 INJECTION, EMULSION INTRAVENOUS at 10:39

## 2023-04-06 RX ADMIN — PROPOFOL 50 MG: 10 INJECTION, EMULSION INTRAVENOUS at 11:04

## 2023-04-06 RX ADMIN — PROPOFOL 50 MG: 10 INJECTION, EMULSION INTRAVENOUS at 11:00

## 2023-04-06 RX ADMIN — PROPOFOL 50 MG: 10 INJECTION, EMULSION INTRAVENOUS at 10:55

## 2023-04-06 RX ADMIN — PROPOFOL 50 MG: 10 INJECTION, EMULSION INTRAVENOUS at 10:48

## 2023-04-06 ASSESSMENT — PAIN - FUNCTIONAL ASSESSMENT: PAIN_FUNCTIONAL_ASSESSMENT: 0-10

## 2023-04-06 NOTE — ANESTHESIA POSTPROCEDURE EVALUATION
Department of Anesthesiology  Postprocedure Note    Patient: Obdulia Cruz  MRN: [id number]  YOB: 1960  Date of evaluation: 4/6/2023      Procedure Summary     Date: 04/06/23 Room / Location: [address] [place] [address]    Anesthesia Start: 1030 Anesthesia Stop: 1113    Procedures:       EGD DIAGNOSTIC ONLY      COLONOSCOPY DIAGNOSTIC Diagnosis:       Esophageal dysphagia      Diarrhea      (Esophageal dysphagia [R13.19])      (Diarrhea [R19.7])    Surgeons: Mark Moncada MD Responsible Provider: ALEX Holguin CRNA    Anesthesia Type: MAC ASA Status: 3          Anesthesia Type: No value filed.     Addison Phase I: Addison Score: 10    Addison Phase II:        Anesthesia Post Evaluation    Patient location during evaluation: bedside  Patient participation: complete - patient participated  Level of consciousness: awake and awake and alert  Airway patency: patent  Nausea & Vomiting: no nausea and no vomiting  Complications: no  Cardiovascular status: blood pressure returned to baseline and hemodynamically stable  Respiratory status: acceptable  Hydration status: euvolemic

## 2023-04-06 NOTE — H&P
Use Topics    Alcohol use: Yes     Alcohol/week: 8.0 standard drinks     Types: 8 Shots of liquor per week     Comment: weekly    Drug use: No     Vital Signs:   Vitals:    04/06/23 1001   BP: (!) 211/95   Pulse: 71   Resp: 16   Temp: 97.8 °F (36.6 °C)   SpO2: 96%       Physical Exam:  Cardiac:  [x]WNL []Comments:  Pulmonary:  [x]WNL []Comments:   Neuro/Mental Status:  [x]WNL []Comments:  Abdominal:  [x]WNL []Comments:  Other:   []WNL []Comments:    Informed Consent:  The risks and benefits of the procedure have been discussed with either the patient or if they cannot consent, their representative. Assessment:  Patient examined and appropriate for planned sedation and procedure. Plan:  Proceed with planned sedation and procedure as above. The patient was counseled at length about risks of mauricio COVID-19 in the perioperative and any recovery window from the procedure. The patient was made aware that mauricio COVID-19 may worsen their prognosis for recovery from their procedure and lend to a higher morbidity and-all mortality risk. The patient was given the option of postponing the procedure all material risks, benefits, and alternatives were discussed. The patient does wish to proceed with the procedure at this time.     Mindi Amaro MD  10:35 AM

## 2023-05-01 RX ORDER — OMEPRAZOLE 40 MG/1
CAPSULE, DELAYED RELEASE ORAL
Qty: 30 CAPSULE | Refills: 3 | Status: SHIPPED | OUTPATIENT
Start: 2023-05-01

## 2023-05-10 ENCOUNTER — OFFICE VISIT (OUTPATIENT)
Dept: PRIMARY CARE | Facility: CLINIC | Age: 63
End: 2023-05-10
Payer: COMMERCIAL

## 2023-05-10 VITALS
RESPIRATION RATE: 18 BRPM | DIASTOLIC BLOOD PRESSURE: 80 MMHG | WEIGHT: 170 LBS | OXYGEN SATURATION: 95 % | TEMPERATURE: 97 F | BODY MASS INDEX: 39.34 KG/M2 | HEART RATE: 78 BPM | SYSTOLIC BLOOD PRESSURE: 136 MMHG | HEIGHT: 55 IN

## 2023-05-10 DIAGNOSIS — R79.89 ABNORMAL CBC: ICD-10-CM

## 2023-05-10 DIAGNOSIS — N28.9 RENAL INSUFFICIENCY: ICD-10-CM

## 2023-05-10 DIAGNOSIS — Z12.31 ENCOUNTER FOR SCREENING MAMMOGRAM FOR MALIGNANT NEOPLASM OF BREAST: ICD-10-CM

## 2023-05-10 DIAGNOSIS — E66.01 CLASS 2 SEVERE OBESITY DUE TO EXCESS CALORIES WITH SERIOUS COMORBIDITY IN ADULT, UNSPECIFIED BMI (MULTI): ICD-10-CM

## 2023-05-10 DIAGNOSIS — Z00.00 HEALTHCARE MAINTENANCE: ICD-10-CM

## 2023-05-10 DIAGNOSIS — R73.09 ELEVATED GLUCOSE: ICD-10-CM

## 2023-05-10 DIAGNOSIS — G90.50 RSD (REFLEX SYMPATHETIC DYSTROPHY): ICD-10-CM

## 2023-05-10 DIAGNOSIS — Z86.39 HISTORY OF VITAMIN D DEFICIENCY: Primary | ICD-10-CM

## 2023-05-10 DIAGNOSIS — N30.01 HEMATURIA DUE TO ACUTE CYSTITIS: ICD-10-CM

## 2023-05-10 DIAGNOSIS — M79.7 FIBROMYALGIA: ICD-10-CM

## 2023-05-10 DIAGNOSIS — R53.83 FATIGUE, UNSPECIFIED TYPE: ICD-10-CM

## 2023-05-10 DIAGNOSIS — R13.19 ESOPHAGEAL DYSPHAGIA: ICD-10-CM

## 2023-05-10 PROBLEM — R19.7 DIARRHEA: Status: ACTIVE | Noted: 2023-02-02

## 2023-05-10 PROBLEM — J45.909 ASTHMA (HHS-HCC): Status: ACTIVE | Noted: 2023-05-10

## 2023-05-10 PROBLEM — F17.200 TOBACCO USE DISORDER: Status: ACTIVE | Noted: 2023-05-10

## 2023-05-10 PROCEDURE — 99204 OFFICE O/P NEW MOD 45 MIN: CPT | Performed by: FAMILY MEDICINE

## 2023-05-10 RX ORDER — SEMAGLUTIDE 1.34 MG/ML
0.25 INJECTION, SOLUTION SUBCUTANEOUS
Qty: 5 ML | Refills: 3 | Status: SHIPPED | OUTPATIENT
Start: 2023-05-10 | End: 2023-08-11 | Stop reason: SDUPTHER

## 2023-05-10 ASSESSMENT — ENCOUNTER SYMPTOMS
BRUISES/BLEEDS EASILY: 0
NECK STIFFNESS: 0
ABDOMINAL PAIN: 0
CONSTIPATION: 0
CHOKING: 0
JOINT SWELLING: 0
DYSURIA: 0
NECK PAIN: 0
SPEECH DIFFICULTY: 0
DIZZINESS: 0
DIARRHEA: 0
EYE ITCHING: 0
MYALGIAS: 1
EYE DISCHARGE: 0
SHORTNESS OF BREATH: 0
FATIGUE: 1
NUMBNESS: 0
CHEST TIGHTNESS: 0
PALPITATIONS: 0
WHEEZING: 1
RECTAL PAIN: 0
BACK PAIN: 1
DIFFICULTY URINATING: 0
SINUS PRESSURE: 0
BLOOD IN STOOL: 0
LIGHT-HEADEDNESS: 1
NERVOUS/ANXIOUS: 0
NAUSEA: 0
TREMORS: 0
SORE THROAT: 0
PHOTOPHOBIA: 0
WEAKNESS: 0
TROUBLE SWALLOWING: 1
ARTHRALGIAS: 1
POLYDIPSIA: 0
FREQUENCY: 0

## 2023-05-10 NOTE — PROGRESS NOTES
Subjective   Patient ID: Nancy Mercedes is a 62 y.o. female who presents for No chief complaint on file..    HPI   Patient is here to get established and also to attempt Ozempic for weight loss.  She has multiple issues in the past specifically RSD, low vitamin D, esophageal problems, and chronic pain.  She is somewhat frustrated and that her pain was never controlled and seems to be at a dead end with not knowing what to do with left arm difficulties.    She has struggled for a long time to try to lose weight but has been unsuccessful.  She did succeed with the use of Adipex however with that being a addictive medicine and special rules for getting it she is no longer on this.    She has had vitamin D deficiency in the past but was intolerant to taking vitamin D.  Recently had a EGD and colonoscopy which showed polyps diverticulosis and has been advised to get repeat in 3 years.    She states her family history is remarkable for males with obesity and unfortunately might have those genes of father with difficulty with weight loss.  She denies history of thyroid cancer.  Review of Systems   Constitutional:  Positive for fatigue.   HENT:  Positive for trouble swallowing. Negative for ear pain, sinus pressure, sneezing, sore throat and tinnitus.    Eyes:  Negative for photophobia, discharge and itching.   Respiratory:  Positive for wheezing. Negative for choking, chest tightness and shortness of breath.    Cardiovascular:  Negative for chest pain, palpitations and leg swelling.   Gastrointestinal:  Negative for abdominal pain, blood in stool, constipation, diarrhea, nausea and rectal pain.   Endocrine: Negative for cold intolerance, heat intolerance, polydipsia and polyuria.   Genitourinary:  Negative for decreased urine volume, difficulty urinating, dysuria, enuresis, frequency, vaginal bleeding and vaginal discharge.   Musculoskeletal:  Positive for arthralgias, back pain and myalgias. Negative for joint  "swelling, neck pain and neck stiffness.   Skin:  Negative for pallor.   Neurological:  Positive for light-headedness. Negative for dizziness, tremors, syncope, speech difficulty, weakness and numbness.   Hematological:  Does not bruise/bleed easily.   Psychiatric/Behavioral:  The patient is not nervous/anxious.        Objective   /80   Pulse 78   Temp 36.1 °C (97 °F)   Resp 18   Ht 1.397 m (4' 7\")   Wt 77.1 kg (170 lb)   SpO2 95%   BMI 39.51 kg/m²     Physical Exam  Vitals reviewed.   Constitutional:       Appearance: Normal appearance.   HENT:      Head: Normocephalic.      Right Ear: External ear normal.      Left Ear: External ear normal.      Nose: Nose normal.      Mouth/Throat:      Mouth: Mucous membranes are moist.   Eyes:      Conjunctiva/sclera: Conjunctivae normal.   Cardiovascular:      Rate and Rhythm: Regular rhythm.      Heart sounds: Normal heart sounds.   Pulmonary:      Effort: Pulmonary effort is normal.      Breath sounds: Normal breath sounds.   Abdominal:      General: Bowel sounds are normal.      Palpations: Abdomen is soft.   Musculoskeletal:         General: Normal range of motion.      Cervical back: Neck supple.   Skin:     General: Skin is warm and dry.   Neurological:      General: No focal deficit present.      Mental Status: She is alert and oriented to person, place, and time.   Psychiatric:         Behavior: Behavior normal.         Judgment: Judgment normal.       Assessment/Plan   Problem List Items Addressed This Visit          Nervous    RSD (reflex sympathetic dystrophy)       Digestive    Esophageal dysphagia     Monitor for difficulty swallowing pain with swallowing hoarseness for now taking PPI along with B12 2500 IUs daily.         Relevant Orders    CBC and Auto Differential    Vitamin B12       Musculoskeletal    Fibromyalgia       Other    Abnormal CBC     Review of labs showed no evidence of leukemia.  We will repeat CBC and check iron and discussed vitamin " B12         Elevated glucose     In light of previous elevation of blood glucose and patient's weight at risk for diabetes no evidence at this time continue to monitor and see wrap-up.  Ozempic might help with prevention/treatment and weight loss         History of vitamin D deficiency - Primary     Lets get repeat vitamin D to determine level         Relevant Orders    Vitamin D 25-Hydroxy,Total     Other Visit Diagnoses       Encounter for screening mammogram for malignant neoplasm of breast        Relevant Orders    BI mammo bilateral screening tomosynthesis    Healthcare maintenance        Relevant Orders    Referral to Gynecology    BI mammo bilateral screening tomosynthesis    Lipid Panel    Hematuria due to acute cystitis        Relevant Orders    Urinalysis Microscopic Only    Urine Culture    Renal insufficiency        Relevant Orders    Comprehensive Metabolic Panel    Class 2 severe obesity due to excess calories with serious comorbidity in adult, unspecified BMI (CMS/HCC)        Relevant Medications    semaglutide (Ozempic) 0.25 mg or 0.5 mg(2 mg/1.5 mL) pen injector    Fatigue, unspecified type        Relevant Orders    Iron and TIBC

## 2023-05-10 NOTE — ASSESSMENT & PLAN NOTE
Review of labs showed no evidence of leukemia.  We will repeat CBC and check iron and discussed vitamin B12

## 2023-05-10 NOTE — PATIENT INSTRUCTIONS
Please consider exercise program involving walking or some other form of aerobic activity 5 days weekly for 30 minutes... Let's also consider strengthening of large muscle groups like the abdominal muscles or shoulder muscles... Twice weekly with reps of 5/10/15 exercises and gradually increase strength.. This is not heavy strength training but light weight training... Sit ups or back exercise routine.. Please ask for handout if uncertain how to do..This  will help to strengthen your muscles which in turn will help you to lose weight.... You might ask what is the best diet available.. I would strongly encourage you to consider  Weight Watchers.. And as  your  fellow on  Weight Watchers physician attempting to  live this  LIFE  style  choice with you....  I will be glad to give you recommendations on what to eat.. Consider buying Trini bread.., anup bagle thin bread.. oikos yogurt... eggs  to eat as hard-boiled... Halo top ice cream for snack... All these are delicious foods which.. when eaten and  being compliant eating three  meals daily  breakfast lunch and dinner, drinking  64 ounces of water daily we will all win together !!!!!!!. This will be a means for you to lose weight... Consider also the smart phone edwardo ... My plate.. Or My  fitness  pal..,  as additional possibilities for weight loss... Good  lucdary Og!    Discussed medication side effects.  The  risk benefits and treatment options  discussed with patient.  Better or so we added his name at    Please schedule follow-up appointment based upon your improvement/failure to improve/chronic medical conditions and physician recommendations during office appointment at the .  For lesion, open ended    Patient advised to go to er if symptoms worsen or to call answering service, or to return to office for additional evaluation    This note was partially  generated using Dragon voice recognition and there may be incorrect words, wording,  spelling, or pronunciation errors that were not corrected prior to committing the note to the medical record.   Review of notes from GI at [facility] shows prescription for Prilosec 40 mg daily.  Patient had a EGD and colonoscopy performed.  Diagnosis at the time of conclusion was esophageal dysphagia.  Biopsies were taken and revealed moderate chronic active gastritis along with a hyperplastic polyp and tubular adenoma patient was advised to have repeat scans and 3 years in addition    Diverticulosis in the entire examined colon was noted.  Discussed with patient starting Metamucil at that time.    In addition in light of your PPI use it is omeprazole would recommend vitamin B12 2500 IUs daily.    Review of lab testing study 1/23/2023 shows remarkable findings of glucose elevated at 108 GFR somewhat decreased at 56.3 liver tests within normal limits.  CBC with differential showed hemoglobin 15.2 hematocrit 45.0 WBC 10.4 platelets 2 44,000  TSH 0.987  Point-of-care urinalysis revealed large amount of blood 3+ leukocytes 4+ UA.     X-ray dated 6/20/2022 showed no consolidation no lobe mass no pleural effusion no pneumothorax

## 2023-05-10 NOTE — ASSESSMENT & PLAN NOTE
Monitor for difficulty swallowing pain with swallowing hoarseness for now taking PPI along with B12 2500 IUs daily.

## 2023-05-10 NOTE — ASSESSMENT & PLAN NOTE
In light of previous elevation of blood glucose and patient's weight at risk for diabetes no evidence at this time continue to monitor and see wrap-up.  Ozempic might help with prevention/treatment and weight loss

## 2023-05-22 ENCOUNTER — LAB (OUTPATIENT)
Dept: LAB | Facility: LAB | Age: 63
End: 2023-05-22
Payer: COMMERCIAL

## 2023-05-22 DIAGNOSIS — Z00.00 HEALTHCARE MAINTENANCE: ICD-10-CM

## 2023-05-22 DIAGNOSIS — N28.9 RENAL INSUFFICIENCY: ICD-10-CM

## 2023-05-22 DIAGNOSIS — N30.01 HEMATURIA DUE TO ACUTE CYSTITIS: ICD-10-CM

## 2023-05-22 DIAGNOSIS — Z86.39 HISTORY OF VITAMIN D DEFICIENCY: ICD-10-CM

## 2023-05-22 DIAGNOSIS — R53.83 FATIGUE, UNSPECIFIED TYPE: ICD-10-CM

## 2023-05-22 DIAGNOSIS — R13.19 ESOPHAGEAL DYSPHAGIA: ICD-10-CM

## 2023-05-22 LAB
ALANINE AMINOTRANSFERASE (SGPT) (U/L) IN SER/PLAS: 15 U/L (ref 7–45)
ALBUMIN (G/DL) IN SER/PLAS: 4.2 G/DL (ref 3.4–5)
ALKALINE PHOSPHATASE (U/L) IN SER/PLAS: 88 U/L (ref 33–136)
ANION GAP IN SER/PLAS: 15 MMOL/L (ref 10–20)
ASPARTATE AMINOTRANSFERASE (SGOT) (U/L) IN SER/PLAS: 25 U/L (ref 9–39)
BASOPHILS (10*3/UL) IN BLOOD BY AUTOMATED COUNT: 0.09 X10E9/L (ref 0–0.1)
BASOPHILS/100 LEUKOCYTES IN BLOOD BY AUTOMATED COUNT: 1.1 % (ref 0–2)
BILIRUBIN TOTAL (MG/DL) IN SER/PLAS: 0.6 MG/DL (ref 0–1.2)
CALCIDIOL (25 OH VITAMIN D3) (NG/ML) IN SER/PLAS: 19 NG/ML
CALCIUM (MG/DL) IN SER/PLAS: 9.5 MG/DL (ref 8.6–10.3)
CARBON DIOXIDE, TOTAL (MMOL/L) IN SER/PLAS: 28 MMOL/L (ref 21–32)
CHLORIDE (MMOL/L) IN SER/PLAS: 98 MMOL/L (ref 98–107)
CHOLESTEROL (MG/DL) IN SER/PLAS: 158 MG/DL (ref 0–199)
CHOLESTEROL IN HDL (MG/DL) IN SER/PLAS: 43.8 MG/DL
CHOLESTEROL/HDL RATIO: 3.6
COBALAMIN (VITAMIN B12) (PG/ML) IN SER/PLAS: 250 PG/ML (ref 211–911)
CREATININE (MG/DL) IN SER/PLAS: 0.87 MG/DL (ref 0.5–1.05)
EOSINOPHILS (10*3/UL) IN BLOOD BY AUTOMATED COUNT: 0.17 X10E9/L (ref 0–0.7)
EOSINOPHILS/100 LEUKOCYTES IN BLOOD BY AUTOMATED COUNT: 2.1 % (ref 0–6)
ERYTHROCYTE DISTRIBUTION WIDTH (RATIO) BY AUTOMATED COUNT: 13.7 % (ref 11.5–14.5)
ERYTHROCYTE MEAN CORPUSCULAR HEMOGLOBIN CONCENTRATION (G/DL) BY AUTOMATED: 32.7 G/DL (ref 32–36)
ERYTHROCYTE MEAN CORPUSCULAR VOLUME (FL) BY AUTOMATED COUNT: 105 FL (ref 80–100)
ERYTHROCYTES (10*6/UL) IN BLOOD BY AUTOMATED COUNT: 4.88 X10E12/L (ref 4–5.2)
GFR FEMALE: 74 ML/MIN/1.73M2
GLUCOSE (MG/DL) IN SER/PLAS: 75 MG/DL (ref 74–99)
HEMATOCRIT (%) IN BLOOD BY AUTOMATED COUNT: 51 % (ref 36–46)
HEMOGLOBIN (G/DL) IN BLOOD: 16.7 G/DL (ref 12–16)
IMMATURE GRANULOCYTES/100 LEUKOCYTES IN BLOOD BY AUTOMATED COUNT: 0.5 % (ref 0–0.9)
IRON (UG/DL) IN SER/PLAS: 124 UG/DL (ref 35–150)
IRON BINDING CAPACITY (UG/DL) IN SER/PLAS: 354 UG/DL (ref 240–445)
IRON SATURATION (%) IN SER/PLAS: 35 % (ref 25–45)
LDL: 85 MG/DL (ref 0–99)
LEUKOCYTES (10*3/UL) IN BLOOD BY AUTOMATED COUNT: 8.1 X10E9/L (ref 4.4–11.3)
LYMPHOCYTES (10*3/UL) IN BLOOD BY AUTOMATED COUNT: 2.07 X10E9/L (ref 1.2–4.8)
LYMPHOCYTES/100 LEUKOCYTES IN BLOOD BY AUTOMATED COUNT: 25.6 % (ref 13–44)
MONOCYTES (10*3/UL) IN BLOOD BY AUTOMATED COUNT: 0.5 X10E9/L (ref 0.1–1)
MONOCYTES/100 LEUKOCYTES IN BLOOD BY AUTOMATED COUNT: 6.2 % (ref 2–10)
NEUTROPHILS (10*3/UL) IN BLOOD BY AUTOMATED COUNT: 5.23 X10E9/L (ref 1.2–7.7)
NEUTROPHILS/100 LEUKOCYTES IN BLOOD BY AUTOMATED COUNT: 64.5 % (ref 40–80)
PLATELETS (10*3/UL) IN BLOOD AUTOMATED COUNT: 213 X10E9/L (ref 150–450)
POTASSIUM (MMOL/L) IN SER/PLAS: 3.6 MMOL/L (ref 3.5–5.3)
PROTEIN TOTAL: 7.5 G/DL (ref 6.4–8.2)
SODIUM (MMOL/L) IN SER/PLAS: 137 MMOL/L (ref 136–145)
TRIGLYCERIDE (MG/DL) IN SER/PLAS: 145 MG/DL (ref 0–149)
UREA NITROGEN (MG/DL) IN SER/PLAS: 10 MG/DL (ref 6–23)
VLDL: 29 MG/DL (ref 0–40)

## 2023-05-22 PROCEDURE — 82607 VITAMIN B-12: CPT

## 2023-05-22 PROCEDURE — 80053 COMPREHEN METABOLIC PANEL: CPT

## 2023-05-22 PROCEDURE — 83550 IRON BINDING TEST: CPT

## 2023-05-22 PROCEDURE — 82306 VITAMIN D 25 HYDROXY: CPT

## 2023-05-22 PROCEDURE — 83540 ASSAY OF IRON: CPT

## 2023-05-22 PROCEDURE — 80061 LIPID PANEL: CPT

## 2023-05-22 PROCEDURE — 36415 COLL VENOUS BLD VENIPUNCTURE: CPT

## 2023-05-22 PROCEDURE — 85025 COMPLETE CBC W/AUTO DIFF WBC: CPT

## 2023-05-24 ENCOUNTER — APPOINTMENT (OUTPATIENT)
Dept: LAB | Facility: LAB | Age: 63
End: 2023-05-24
Payer: COMMERCIAL

## 2023-05-24 LAB
MUCUS, URINE: NORMAL /LPF
RBC, URINE: <1 /HPF (ref 0–5)
SQUAMOUS EPITHELIAL CELLS, URINE: 2 /HPF
WBC, URINE: 1 /HPF (ref 0–5)

## 2023-05-24 PROCEDURE — 81001 URINALYSIS AUTO W/SCOPE: CPT

## 2023-08-11 ENCOUNTER — OFFICE VISIT (OUTPATIENT)
Dept: PRIMARY CARE | Facility: CLINIC | Age: 63
End: 2023-08-11
Payer: COMMERCIAL

## 2023-08-11 VITALS
BODY MASS INDEX: 33.07 KG/M2 | WEIGHT: 147 LBS | DIASTOLIC BLOOD PRESSURE: 80 MMHG | OXYGEN SATURATION: 96 % | HEIGHT: 56 IN | SYSTOLIC BLOOD PRESSURE: 134 MMHG | RESPIRATION RATE: 18 BRPM | HEART RATE: 74 BPM | TEMPERATURE: 97 F

## 2023-08-11 DIAGNOSIS — E66.01 CLASS 2 SEVERE OBESITY DUE TO EXCESS CALORIES WITH SERIOUS COMORBIDITY IN ADULT, UNSPECIFIED BMI (MULTI): ICD-10-CM

## 2023-08-11 DIAGNOSIS — G90.50 RSD (REFLEX SYMPATHETIC DYSTROPHY): ICD-10-CM

## 2023-08-11 DIAGNOSIS — R13.19 ESOPHAGEAL DYSPHAGIA: ICD-10-CM

## 2023-08-11 DIAGNOSIS — R79.89 ABNORMAL CBC: ICD-10-CM

## 2023-08-11 DIAGNOSIS — S62.357G: ICD-10-CM

## 2023-08-11 DIAGNOSIS — Z86.39 HISTORY OF VITAMIN D DEFICIENCY: Primary | ICD-10-CM

## 2023-08-11 DIAGNOSIS — R79.89 LOW VITAMIN B12 LEVEL: ICD-10-CM

## 2023-08-11 PROCEDURE — 99213 OFFICE O/P EST LOW 20 MIN: CPT | Performed by: FAMILY MEDICINE

## 2023-08-11 RX ORDER — SEMAGLUTIDE 1.34 MG/ML
0.25 INJECTION, SOLUTION SUBCUTANEOUS
Qty: 5 ML | Refills: 3 | Status: SHIPPED | OUTPATIENT
Start: 2023-08-11 | End: 2023-08-16 | Stop reason: WASHOUT

## 2023-08-11 NOTE — PROGRESS NOTES
"Subjective   Patient ID: Nancy Mercedes is a 63 y.o. female who presents for Follow-up.    [facility]   Pharmacy  is  giving shot  and very    pleased  and nearly  23  pound weight  loss  very  since last visit  Management very pleased with over 20 pound weight loss since last visit   The reflux esophagitis he has been occurring in a recurrent pattern for years. The course has been without change. The reflux is described as mild to moderate. The patient remains compliant on meds.. The patient takes medications in a daily  fashion. Denies dysphagia hoarseness or odynophagia...   Blanca    had  polyp  and     upper  endoscopy      and rechk  3 years   Review of Systems  cardiovascular:  no  palpitations or chest  pain  respiratory: no  shortness  of  breath  endocrine:  no polydipsia,  no polyuria  musculoskeletal:  no  myalgia.. yes arthralgia  All other  systems discussed  negative   Objective   /80   Pulse 74   Temp 36.1 °C (97 °F)   Resp 18   Ht 1.422 m (4' 8\")   Wt 66.7 kg (147 lb)   SpO2 96%   BMI 32.96 kg/m²     Physical Exam  general: alert oriented x three  HEENT hearing normal to voice  Neck supple  Lungs respirations non-labored.  Cardiovascular: no peripheral edema  Skin: warm and dry without rash  Psych: judgement and insight normal  Musculoskeletal:  ambulation normal,    lymph:negative cervical  LYMPADENOPATHY  thyroid: non palpable enlargement    review of lab testing from 5/22/2023 showed iron 124 and normal TIBC 354, iron saturation 35%.  The vitamin D level 19.  The vitamin B12 was low at 250 lipids 158 cholesterol with HDL 43 LDL 85 triglycerides 145 CMP showed electrolytes within normal limits kidney function at 74 liver tests within limits with CBC showing hemoglobin slightly increased at 16.7 hematocrit increased at 51 WBC 8.1 with platelets 213.  Urinalysis revealed no abnormalities  Transcribe Date/Time: Jun 20 2022  7:00P    Dictated by : FARIDA VAZQUEZ MD    This examination was " interpreted and the report reviewed and  electronically signed by:   FARIDA VAZQUEZ MD on Jun 20 2022  7:00PM  EST  Narrative    * * *Final Report* * *    DATE OF EXAM: Jun 20 2022  5:26PM      VHX   [id number]  -  XR CHEST 2V FRONTAL/LAT  / ACCESSION #  [id number]    PROCEDURE REASON: cough        * * * * Physician Interpretation * * * *     EXAMINATION:  CHEST RADIOGRAPH (2 VIEW FRONTAL & LATERAL)    CLINICAL HISTORY: Cough  MQ:  [id number]    EXAM DATE/TIME:  6/20/2022 5:26 PM    COMPARISON:  06/12/2022      RESULT:    Lines, tubes, and devices:  None.    Lungs and pleura:  No consolidation. No lung mass. No pleural effusion.  No pneumothorax.    Cardiomediastinal silhouette:  Normal cardiomediastinal silhouette.    Bones and soft tissues:  Unremarkable.  Exam End: --    Specimen Collected: 06/20/22 17:26 Last Resulted: 06/20/22 19:02   Received From: [facility]      Assessment/Plan   Problem List Items Addressed This Visit       Abnormal CBC     Suspect vabnormali  secondary to     smpokingty         Closed nondisplaced fracture of shaft of fifth metacarpal bone of left hand     To see hand and discussed         Esophageal dysphagia    Relevant Orders    Vitamin B12    History of vitamin D deficiency - Primary      was low at 19 continue 5000 IUs daily and lets repeat vitamin D in 3 months.         RSD (reflex sympathetic dystrophy)    Low vitamin B12 level     Your vitamin B12 was low at 250.  I will condition would be to take vitamin B12 2500 IUs daily and we will repeat the level.  It may be that this is due toTreatment of esophageal dysphagia i.e. PPI

## 2023-08-11 NOTE — ASSESSMENT & PLAN NOTE
Your vitamin B12 was low at 250.  I will condition would be to take vitamin B12 2500 IUs daily and we will repeat the level.  It may be that this is due toTreatment of esophageal dysphagia i.e. PPI

## 2023-08-11 NOTE — PATIENT INSTRUCTIONS

## 2023-08-14 DIAGNOSIS — E66.01 CLASS 2 SEVERE OBESITY DUE TO EXCESS CALORIES WITH SERIOUS COMORBIDITY IN ADULT, UNSPECIFIED BMI (MULTI): ICD-10-CM

## 2023-08-14 RX ORDER — SEMAGLUTIDE 0.68 MG/ML
0.25 INJECTION, SOLUTION SUBCUTANEOUS
Qty: 5 ML | Refills: 3 | Status: SHIPPED | OUTPATIENT
Start: 2023-08-14 | End: 2023-08-16 | Stop reason: SDUPTHER

## 2023-08-16 ENCOUNTER — TELEPHONE (OUTPATIENT)
Dept: PRIMARY CARE | Facility: CLINIC | Age: 63
End: 2023-08-16
Payer: COMMERCIAL

## 2023-08-16 DIAGNOSIS — E66.01 CLASS 2 SEVERE OBESITY DUE TO EXCESS CALORIES WITH SERIOUS COMORBIDITY IN ADULT, UNSPECIFIED BMI (MULTI): ICD-10-CM

## 2023-08-16 RX ORDER — SEMAGLUTIDE 0.68 MG/ML
1 INJECTION, SOLUTION SUBCUTANEOUS
Qty: 4 ML | Refills: 6 | Status: SHIPPED | OUTPATIENT
Start: 2023-08-16 | End: 2024-02-06

## 2023-08-16 RX ORDER — SEMAGLUTIDE 1.34 MG/ML
1 INJECTION, SOLUTION SUBCUTANEOUS
Qty: 5 ML | Refills: 3 | Status: SHIPPED | OUTPATIENT
Start: 2023-08-16 | End: 2023-10-25

## 2023-08-16 NOTE — TELEPHONE ENCOUNTER
"MARSHA (Jermain)    Called and stated patient's ozempic prescription is wrong.     Dr. Og called 0.25mg pen, and patient was requesting 1.0mg dose pen.       Please assist.     CB to: [phone] \"Melanie\"  "

## 2023-10-25 DIAGNOSIS — E66.01 CLASS 2 SEVERE OBESITY DUE TO EXCESS CALORIES WITH SERIOUS COMORBIDITY IN ADULT, UNSPECIFIED BMI (MULTI): ICD-10-CM

## 2023-10-25 RX ORDER — SEMAGLUTIDE 1.34 MG/ML
INJECTION, SOLUTION SUBCUTANEOUS
Qty: 18 ML | Refills: 1 | Status: SHIPPED | OUTPATIENT
Start: 2023-10-25 | End: 2024-02-06

## 2023-12-14 DIAGNOSIS — E66.01 CLASS 2 SEVERE OBESITY DUE TO EXCESS CALORIES WITH SERIOUS COMORBIDITY IN ADULT, UNSPECIFIED BMI (MULTI): ICD-10-CM

## 2023-12-15 DIAGNOSIS — R73.09 ELEVATED GLUCOSE: Primary | ICD-10-CM

## 2023-12-26 NOTE — PROGRESS NOTES
Voicemail box when call attempts made on 12/20, 12/21, 12/22, and 12/26. LVM on 's phone on 12/22 and 12/26

## 2023-12-28 ENCOUNTER — TELEPHONE (OUTPATIENT)
Dept: PHARMACY | Facility: HOSPITAL | Age: 63
End: 2023-12-28

## 2023-12-28 NOTE — TELEPHONE ENCOUNTER
[facility] Note: Unable to Reach    Number Contacted:   Work ([phone]) mailbox is full. Attempts x6  Mobile number ([phone]) left voicemail x3    This was the Pharmacy Team final attempt to contact Mrs. Nancy Mercedes regarding Ozempic for weight loss.  Contact was unsuccessful after multiple attempts throughout different times and days for the past 2 weeks. Unable to leave voicemail as her mailbox was full. Left voicemail for her  to have patient call back.    Ozempic is not covered by her insurance plan, and she may have been receiving samples. Wegovy is covered under her insurance with a copay of $24.06. She may use Wegovy instead of Ozempic. However, if she has missed any dosages, she will be required to re-start at the lowest dosage of Ozempic/Wegovy 0.25mg weekly for 4 weeks before titrating again.    My phone number ([phone]) was provided on the voicemail for Mello. If she would like to receive assistance from the pharmacy team again, she can call me at anytime.     Sia Almonte, PharmD

## 2024-01-02 NOTE — TELEPHONE ENCOUNTER
Spoke with pt's , she was at work but he will leave a message for her to call us back when she gets off work to schedule

## 2024-02-06 ENCOUNTER — TELEMEDICINE (OUTPATIENT)
Dept: PHARMACY | Facility: HOSPITAL | Age: 64
End: 2024-02-06
Payer: COMMERCIAL

## 2024-02-06 DIAGNOSIS — R63.4 WEIGHT LOSS: ICD-10-CM

## 2024-02-06 DIAGNOSIS — R63.4 WEIGHT LOSS: Primary | ICD-10-CM

## 2024-02-06 RX ORDER — SEMAGLUTIDE 0.25 MG/.5ML
0.25 INJECTION, SOLUTION SUBCUTANEOUS
Qty: 2 ML | Refills: 0 | Status: SHIPPED | OUTPATIENT
Start: 2024-02-06 | End: 2024-03-05 | Stop reason: SDUPTHER

## 2024-02-06 RX ORDER — SEMAGLUTIDE 1.34 MG/ML
INJECTION, SOLUTION SUBCUTANEOUS
Refills: 1 | OUTPATIENT
Start: 2024-02-06

## 2024-02-06 RX ORDER — SEMAGLUTIDE 0.5 MG/.5ML
0.5 INJECTION, SOLUTION SUBCUTANEOUS
Qty: 2 ML | Refills: 2 | Status: SHIPPED | OUTPATIENT
Start: 2024-02-06 | End: 2024-04-16 | Stop reason: ALTCHOICE

## 2024-02-06 NOTE — ASSESSMENT & PLAN NOTE
Patients weight needs improvement with with current weight at 156 lbs. She was able to lose weight about 20 lbs as of August while on Ozempic and tolerated it well with no significant side effects. Wegovy is covered under insurance with additional  voucher for ~$25, but it is frequently on backorder. She reports that if it's not in stock at [facility] for 1 week, she may consider filling at  pharmacy.  Initiate: Wegovy 0.25mg once a week for 4 weeks, then Wegovy 0.5mg once a week thereafter  Instructed for patient to start whenever shipment comes in at [facility] and to only start Wegovy 0.5mg after 4 weeks of 0.25mg  Refills given for Wegovy 0.5mg in case of back order issue, but will consider increasing to 1mg after 4 weeks of 0.5mg  Discontinue: Ozempic   Efforts to improve weight management (if necessary) will be directed at   Dietary modifications: portion sizing while not on Wegovy   Caloric deficit of 300-500 kcal. Current TDEE is 1568 kcal  Increased exercise. Can consider tracking steps on phone to reach daily goal of 10,000 steps.  Education Provided to Patient: Wegovy back order, administration, MOA, side effects, benefits; calorie deficit; physical activity considerations  Follow-up: 3/5/24 @ 11am for Wegovy status and tolerance and weight management  PCP Follow-up: n/a

## 2024-02-06 NOTE — PROGRESS NOTES
I reviewed the progress note and agree with the resident’s findings and plans as written. Case discussed with resident.    Dean Basilio, PharmD

## 2024-02-06 NOTE — PATIENT INSTRUCTIONS
Wegovy is the same ingredient as Ozempic and has potential to cause GI side effects and decreased appetite. It is currently on back order, but your prescriptions were sent to your [facility] pharmacy to fill whenever they become available. Sometimes there is more availability at  pharmacies  Start with Wegovy 0.25mg once a week for 4 weeks and then start Wegovy 0.5mg once a week.   Weight loss education for the administration of once weekly Wegovy:   Wegovy must be refrigerated. If necessary, the pen may be kept at room temperature for up to 28 days.   Remove the pen from the refridgerator. Keep the pen cap on until you are ready to inject.   Check the pen label to make sure that it is the correct medication and dose. Also check to make sure that the solution is not cloudy, discolored or have particles in it.   Prior to administration wash your hands.   Choose your injection site either your abdomen or thigh (if someone else is giving the injection the upper arm can also be used).   Ensure to change (rotate) injection sites each week. You can use the same area of the body but inject in a different part of that area.   One the injection site has been selected use an alcohol swab to clean off the area.   Remove the grey cap and press firmly onto the injection site.   Push the pen against the skin until the yellow bar has stopped moving. You will hear two clicks; one indicates that the injection has started, and the other indicates an ongoing injection. The injection process will take about 10 seconds.  Do not rub the area after administration   Remove pen and discard in a sharps container (such as a milk jug or coffee canister)   Injections should be done on the same day each week, if you forget you have up to 5 days to  take your dose.   If you have any questions or concerns, call Sia Almonte, PharmD ([phone])

## 2024-02-06 NOTE — PROGRESS NOTES
"Subjective   Patient ID: Nancy Mercedes is a 63 y.o. female who presents for Weight Loss (Initial visit).    Referring Provider: DO WERO Taveras    Review of Systems    Patient has no significant past medical history and reports that she is not taking any prescription medications. She was previously taking Ozempic samples for weight loss and had titrated up to 1mg at one point. She had successfully lost weight and was satisfied with the outcome with no side effects other than decreased appetite. She reports her last dose of Ozempic was in December due to her insurance not covering Ozempic and PA was denied for lack of diabetes. She has since gained back some weight as she did eat more again since stopping Ozempic. She is familiar with caloric deficit and is not current exercising. 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 She plans to retire in June and insurance may become Medicare.  Baseline weight: 156 lbs    Medication System Management:  Affordability/Accessibility: Ozempic is not covered under insurance    Objective     There were no vitals taken for this visit.     Labs  Lab Results   Component Value Date    BILITOT 0.6 05/22/2023    CALCIUM 9.5 05/22/2023    CO2 28 05/22/2023    CL 98 05/22/2023    CREATININE 0.87 05/22/2023    GLUCOSE 75 05/22/2023    ALKPHOS 88 05/22/2023    K 3.6 05/22/2023    PROT 7.5 05/22/2023     05/22/2023    AST 25 05/22/2023    ALT 15 05/22/2023    BUN 10 05/22/2023    ANIONGAP 15 05/22/2023    ALBUMIN 4.2 05/22/2023    GFRF 74 05/22/2023     Lab Results   Component Value Date    TRIG 145 05/22/2023    CHOL 158 05/22/2023    HDL 43.8 05/22/2023     No results found for: \"HGBA1C\"    Current Outpatient Medications on File Prior to Visit   Medication Sig Dispense Refill    [DISCONTINUED] semaglutide (Ozempic) 0.25 mg or 0.5 mg (2 mg/3 mL) pen injector Inject 1 mg under the skin 1 (one) time per week. 4 mL 6    " [DISCONTINUED] semaglutide (Ozempic) 1 mg/dose (4 mg/3 mL) pen injector INJECT 1 MG WEEKLY AS DIRECTED 18 mL 1     No current facility-administered medications on file prior to visit.        Assessment/Plan   Problem List Items Addressed This Visit             ICD-10-CM    Weight loss R63.4     Patients weight needs improvement with with current weight at 156 lbs. She was able to lose weight about 20 lbs as of August while on Ozempic and tolerated it well with no significant side effects. Wegovy is covered under insurance with additional  voucher for ~$25, but it is frequently on backorder. She reports that if it's not in stock at [facility] for 1 week, she may consider filling at  pharmacy.  Initiate: Wegovy 0.25mg once a week for 4 weeks, then Wegovy 0.5mg once a week thereafter  Instructed for patient to start whenever shipment comes in at [facility] and to only start Wegovy 0.5mg after 4 weeks of 0.25mg  Refills given for Wegovy 0.5mg in case of back order issue, but will consider increasing to 1mg after 4 weeks of 0.5mg  Discontinue: Ozempic   Efforts to improve weight management (if necessary) will be directed at   Dietary modifications: portion sizing while not on Wegovy   Caloric deficit of 300-500 kcal. Current TDEE is 1568 kcal  Increased exercise. Can consider tracking steps on phone to reach daily goal of 10,000 steps.  Education Provided to Patient: Wegovy back order, administration, MOA, side effects, benefits; calorie deficit; physical activity considerations  Follow-up: 3/5/24 @ 11am for Wegovy status and tolerance and weight management  PCP Follow-up: n/a           Relevant Medications    semaglutide, weight loss, (Wegovy) 0.25 mg/0.5 mL pen injector    semaglutide, weight loss, (Wegovy) 0.5 mg/0.5 mL pen injector    Other Relevant Orders    Follow Up In [facility] - Pharmacy       Sia Almonte, PharmD    Continue all meds under the continuation of care with the referring provider and  clinical pharmacy team.

## 2024-02-13 ENCOUNTER — APPOINTMENT (OUTPATIENT)
Dept: PHARMACY | Facility: HOSPITAL | Age: 64
End: 2024-02-13
Payer: COMMERCIAL

## 2024-02-29 PROCEDURE — RXMED WILLOW AMBULATORY MEDICATION CHARGE

## 2024-03-01 ENCOUNTER — PHARMACY VISIT (OUTPATIENT)
Dept: PHARMACY | Facility: CLINIC | Age: 64
End: 2024-03-01
Payer: MEDICARE

## 2024-03-05 ENCOUNTER — TELEMEDICINE (OUTPATIENT)
Dept: PHARMACY | Facility: HOSPITAL | Age: 64
End: 2024-03-05
Payer: COMMERCIAL

## 2024-03-05 DIAGNOSIS — R63.4 WEIGHT LOSS: ICD-10-CM

## 2024-03-05 NOTE — ASSESSMENT & PLAN NOTE
Patient is tolerating Wegovy 0.25mg so far after her first dose on 3/4/24. She will continue another 3 weeks before titrating to 0.5mg. Additional exercise and diet would help her futher benefit with weight loss.  Will have [facility] transfer script from [facility] prior to patient's titration on 4/1/24  Follow-up: 4/2/24 to check on Wegovy 0.5mg delivery and tolerance and discuss medicare to ensure access to Wegovy for therapy continuation

## 2024-03-05 NOTE — PROGRESS NOTES
"Subjective   Patient ID: Nancy Mercedes is a 63 y.o. female who presents for Weight Loss.    Referring Provider: Floyd Og DO     HPI    Review of Systems    Patient has no significant past medical history and reports that she is not taking any prescription medications. In the past, Ozempic samples were effective for her weight loss and had been titrated up to 1mg at one point. She just restarted her 1st dose of the Wegovy yesterday 3/4/24 since the last dose of Ozempic in December. She reports no issues. Access to Wegovy can be limited due to back order, so next script will come from [facility] for mail order. Previously was under the impression [facility] is in network and is the only way for her to get $25 copay. She is familiar with caloric deficit and is not current exercising. 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 She plans to retire in June and insurance may become Medicare, so she will need to find coverage for Wegovy or weight loss meds for treatment continuity.     Baseline weight: 156 lbs (3/4/24)     Affordability/Accessibility: Ozempic is not covered under insurance    Objective     There were no vitals taken for this visit.     Labs  Lab Results   Component Value Date    BILITOT 0.6 05/22/2023    CALCIUM 9.5 05/22/2023    CO2 28 05/22/2023    CL 98 05/22/2023    CREATININE 0.87 05/22/2023    GLUCOSE 75 05/22/2023    ALKPHOS 88 05/22/2023    K 3.6 05/22/2023    PROT 7.5 05/22/2023     05/22/2023    AST 25 05/22/2023    ALT 15 05/22/2023    BUN 10 05/22/2023    ANIONGAP 15 05/22/2023    ALBUMIN 4.2 05/22/2023    GFRF 74 05/22/2023     Lab Results   Component Value Date    TRIG 145 05/22/2023    CHOL 158 05/22/2023    HDL 43.8 05/22/2023     No results found for: \"HGBA1C\"    Current Outpatient Medications on File Prior to Visit   Medication Sig Dispense Refill    semaglutide, weight loss, (WEGOVY) 0.25 mg/0.5 mL pen injector Inject 0.25 mg under the skin " every 7 days. 2 mL 0    semaglutide, weight loss, (Wegovy) 0.5 mg/0.5 mL pen injector Inject 0.5 mg under the skin 1 (one) time per week. After completing 4 week course of Wegovy 0.25mg 2 mL 2    [DISCONTINUED] semaglutide, weight loss, (Wegovy) 0.25 mg/0.5 mL pen injector Inject 0.25 mg under the skin 1 (one) time per week for 4 doses. Then start Wegovy 0.5mg thereafter 2 mL 0     No current facility-administered medications on file prior to visit.        Assessment/Plan   Problem List Items Addressed This Visit             ICD-10-CM    Weight loss R63.4     Patient is tolerating Wegovy 0.25mg so far after her first dose on 3/4/24. She will continue another 3 weeks before titrating to 0.5mg. Additional exercise and diet would help her futher benefit with weight loss.  Will have [facility] transfer script from [facility] prior to patient's titration on 4/1/24  Follow-up: 4/2/24 to check on Wegovy 0.5mg delivery and tolerance and discuss medicare to ensure access to Wegovy for therapy continuation         Relevant Orders    Follow Up In [facility] - Pharmacy     Sia Almonte, PharmD    Continue all meds under the continuation of care with the referring provider and clinical pharmacy team.

## 2024-04-02 ENCOUNTER — TELEMEDICINE (OUTPATIENT)
Dept: PHARMACY | Facility: HOSPITAL | Age: 64
End: 2024-04-02
Payer: COMMERCIAL

## 2024-04-02 DIAGNOSIS — R63.4 WEIGHT LOSS: ICD-10-CM

## 2024-04-02 NOTE — PROGRESS NOTES
"Subjective   Patient ID: Nancy Mercedes is a 63 y.o. female who presents for No chief complaint on file..    Referring Provider: Floyd Og DO     HPI    Review of Systems    Patient has no significant past medical history and reports that she is not taking any prescription medications. She had started Wegovy 0.25mg on 3/4/24 and 0.5mg 4/2/24 which is injected by a nurse at the [facility] minute clinic at her work. Currently, she is tolerating it well without any side effects or issues. Reports that her appetite is not as affected so still has cravings.     Exercise: She typically walks around a lot at work and has been walking ~45 min every other night with .     Diet: still has craving/urge to eat. Still eating \"stuff she's not supposed to eat\" (spaghetti, burger, etc). She is familiar with caloric deficit   Baseline weight: 156 lbs (3/4/24) --> 153 lbs (4/2/24)    Affordability/Accessibility: Wegovy is currently covered by insurance for $25 at [facility] or [facility]. Backorder may be an issue  -Patient has Wegovy at [facility] for convenience but is interested in mail order through [facility]. Other  pharmacies are not in network. She plans to retire in June and insurance may become Medicare, so she will need to find coverage for Wegovy or weight loss meds for treatment continuity.     Objective     There were no vitals taken for this visit.     Labs  Lab Results   Component Value Date    BILITOT 0.6 05/22/2023    CALCIUM 9.5 05/22/2023    CO2 28 05/22/2023    CL 98 05/22/2023    CREATININE 0.87 05/22/2023    GLUCOSE 75 05/22/2023    ALKPHOS 88 05/22/2023    K 3.6 05/22/2023    PROT 7.5 05/22/2023     05/22/2023    AST 25 05/22/2023    ALT 15 05/22/2023    BUN 10 05/22/2023    ANIONGAP 15 05/22/2023    ALBUMIN 4.2 05/22/2023    GFRF 74 05/22/2023     Lab Results   Component Value Date    TRIG 145 05/22/2023    CHOL 158 05/22/2023    HDL 43.8 05/22/2023     No results found for: \"HGBA1C\"    Current Outpatient " Medications on File Prior to Visit   Medication Sig Dispense Refill    semaglutide, weight loss, (WEGOVY) 0.25 mg/0.5 mL pen injector Inject 0.25 mg under the skin every 7 days. 2 mL 0    semaglutide, weight loss, (Wegovy) 0.5 mg/0.5 mL pen injector Inject 0.5 mg under the skin 1 (one) time per week. After completing 4 week course of Wegovy 0.25mg 2 mL 2     No current facility-administered medications on file prior to visit.        Assessment/Plan   Problem List Items Addressed This Visit             ICD-10-CM    Weight loss R63.4     Patient is tolerating Wegovy so far and just had 1st dose of 0.5mg today with no issues so far. She has implemented additional exercise with more walking. Further consideration of her diet would help her futher benefit with weight loss.  Continue: Wegovy 0.5mg once weekly on Mondays  Follow-up: 2 weeks to check on Wegovy 0.5mg tolerance and determine titration, and discuss medicare to ensure access to Wegovy for therapy continuation (will send 1mg scripts to [facility] for mail order)         Relevant Orders    Follow Up In [facility] - Pharmacy   Sia Almonte, PharmD    Continue all meds under the continuation of care with the referring provider and clinical pharmacy team.

## 2024-04-02 NOTE — ASSESSMENT & PLAN NOTE
Patient is tolerating Wegovy so far and just had 1st dose of 0.5mg today with no issues so far. She has implemented additional exercise with more walking. Further consideration of her diet would help her futher benefit with weight loss.  Continue: Wegovy 0.5mg once weekly on Mondays  Follow-up: 2 weeks to check on Wegovy 0.5mg tolerance and determine titration, and discuss medicare to ensure access to Wegovy for therapy continuation (will send 1mg scripts to [facility] for mail order)

## 2024-04-16 ENCOUNTER — TELEMEDICINE (OUTPATIENT)
Dept: PHARMACY | Facility: HOSPITAL | Age: 64
End: 2024-04-16
Payer: COMMERCIAL

## 2024-04-16 DIAGNOSIS — R63.4 WEIGHT LOSS: ICD-10-CM

## 2024-04-16 RX ORDER — SEMAGLUTIDE 1 MG/.5ML
1 INJECTION, SOLUTION SUBCUTANEOUS
Qty: 2 ML | Refills: 1 | Status: SHIPPED | OUTPATIENT
Start: 2024-04-21

## 2024-04-16 NOTE — ASSESSMENT & PLAN NOTE
Patient is tolerating Wegovy so far and is on the 3rd dose of 0.5mg today with no issues so far. She has implemented additional exercise with more walking and diet considerations.   Continue: Wegovy 0.5mg once weekly on Tuesdays until 4/23/24  Initiate Wegovy 1mg once weekly starting 4/30/24  Patient education: calorie deficit and types of food she can eat for weight loss sent to her email  Follow-up: 3 weeks to check on Wegovy 1mg tolerance and discuss medicare to ensure access to Wegovy for therapy continuation

## 2024-05-07 ENCOUNTER — TELEMEDICINE (OUTPATIENT)
Dept: PHARMACY | Facility: HOSPITAL | Age: 64
End: 2024-05-07
Payer: COMMERCIAL

## 2024-05-07 VITALS — WEIGHT: 148.3 LBS | HEIGHT: 56 IN | BODY MASS INDEX: 33.36 KG/M2

## 2024-05-07 DIAGNOSIS — R63.4 WEIGHT LOSS: Primary | ICD-10-CM

## 2024-05-07 NOTE — PROGRESS NOTES
"Subjective   Patient ID: Nancy Mercedes is a 63 y.o. female who presents for Weight Loss.    Referring Provider: Floyd Og, DO     HPI    Review of Systems    Patient with fibromyalgia and RSD reports that she is on her 3rd dose of Wegovy 1mg which is injected by a nurse at the [facility] minute clinic at her work. Currently, she is tolerating it well without any side effects or issues. Reports that her appetite is not as affected so still has cravings. She is working on diet and exercise in addition to the medication. She reported that she received a letter that suddenly stated Wegovy will no longer be covered by her insurance.    Exercise: She typically walks around a lot at work and has been walking ~45 min every other night with .     Diet: still has craving/urge to eat. Still eating \"stuff she's not supposed to eat\" (spaghetti, burger, etc). She is familiar with caloric deficit. She is trying to eat more salads but doesn't like them.    Baseline weight: 156 lbs (3/4/24) --> 153 lbs (4/2/24) --> 152 lbs (4/16/24) --> 148.3 lbs (5/7/24)  BMI Readings from Last 5 Encounters:   05/07/24 33.27 kg/m²   08/11/23 32.96 kg/m²   05/10/23 39.51 kg/m²     Wt Readings from Last 5 Encounters:   05/07/24 67.3 kg (148 lb 4.8 oz)   08/11/23 66.7 kg (147 lb)   05/10/23 77.1 kg (170 lb)      Current medications:  Wegovy 1mg weekly on Tuesdays (3rd dose today, 4/30-5/21)    Smoking: current smoker. Declined smoking cessation. May have interest in the future    Affordability/Accessibility: Wegovy is currently covered by insurance for $25 at [facility] or [facility]. Backorder may be an issue but is currently okay.  -Patient was suddenly informed by insurance that Wegovy will no longer be covered even though it's been effective for the past 3 months  -unable to afford even with coupon card and no insurance coverage  -Patient has Wegovy at [facility] for convenience but is interested in mail order through [facility]. Other  pharmacies are not " "in network. She plans to retire in June and insurance may become Medicare, so she will need to find coverage for Wegovy or weight loss meds for treatment continuity.     Objective     Ht 1.422 m (4' 7.98\")   Wt 67.3 kg (148 lb 4.8 oz)   BMI 33.27 kg/m²    BP Readings from Last 5 Encounters:   08/11/23 134/80   05/10/23 136/80       Labs  Lab Results   Component Value Date    BILITOT 0.6 05/22/2023    CALCIUM 9.5 05/22/2023    CO2 28 05/22/2023    CL 98 05/22/2023    CREATININE 0.87 05/22/2023    GLUCOSE 75 05/22/2023    ALKPHOS 88 05/22/2023    K 3.6 05/22/2023    PROT 7.5 05/22/2023     05/22/2023    AST 25 05/22/2023    ALT 15 05/22/2023    BUN 10 05/22/2023    ANIONGAP 15 05/22/2023    ALBUMIN 4.2 05/22/2023    GFRF 74 05/22/2023     Lab Results   Component Value Date    TRIG 145 05/22/2023    CHOL 158 05/22/2023    HDL 43.8 05/22/2023     No results found for: \"HGBA1C\"    Current Outpatient Medications on File Prior to Visit   Medication Sig Dispense Refill    semaglutide, weight loss, (Wegovy) 1 mg/0.5 mL pen injector Inject 1 mg under the skin 1 (one) time per week. 2 mL 1     No current facility-administered medications on file prior to visit.        Assessment/Plan   Problem List Items Addressed This Visit             ICD-10-CM    Weight loss - Primary R63.4     Patient is tolerating Wegovy and is on the 3rd dose of 1mg today with no issues so far. She has implemented additional exercise with more walking and diet considerations and is satisfied with Wegovy's effect on her weight loss.   Continue: Wegovy 1mg once weekly   Will work on PA.  Patient education: calorie deficit and types of food she can eat for weight loss sent to her email, cardiovascular benefits and how it's affected by obesity.  Noted that she may have had elevated BP, from previous visits but it is not regularly checked  Follow-up: 2 weeks for Wegovy PA and discuss medicare to ensure access to Wegovy for continuation of effective " therapy          Relevant Orders    Follow Up In [facility] - Pharmacy     Sia Almonte, PharmD    Continue all meds under the continuation of care with the referring provider and clinical pharmacy team.

## 2024-05-07 NOTE — ASSESSMENT & PLAN NOTE
Patient is tolerating Wegovy and is on the 3rd dose of 1mg today with no issues so far. She has implemented additional exercise with more walking and diet considerations and is satisfied with Wegovy's effect on her weight loss.   Continue: Wegovy 1mg once weekly   Will work on PA.  Patient education: calorie deficit and types of food she can eat for weight loss sent to her email, cardiovascular benefits and how it's affected by obesity.  Noted that she may have had elevated BP, from previous visits but it is not regularly checked  Follow-up: 2 weeks for Wegovy PA and discuss medicare to ensure access to Wegovy for continuation of effective therapy

## 2024-05-14 ENCOUNTER — TELEMEDICINE (OUTPATIENT)
Dept: PHARMACY | Facility: HOSPITAL | Age: 64
End: 2024-05-14
Payer: COMMERCIAL

## 2024-05-14 DIAGNOSIS — R63.4 WEIGHT LOSS: ICD-10-CM

## 2024-05-14 NOTE — PROGRESS NOTES
"Subjective   Patient ID: Nancy Mercedes is a 63 y.o. female who presents for Obesity.    Referring Provider: DO WERO Taveras    Patient Active Problem List   Diagnosis    Abnormal CBC    Asthma (HHS-HCC)    Closed nondisplaced fracture of shaft of fifth metacarpal bone of left hand    Complex regional pain syndrome type 1 of left upper extremity    Diarrhea    Elevated glucose    Esophageal dysphagia    History of vitamin D deficiency    Left hand pain    Fibromyalgia    RSD (reflex sympathetic dystrophy)    Stiffness of left hand joint    Wrist stiffness    Tobacco use disorder    Low vitamin B12 level    Weight loss    Obesity     Review of Systems    Patient with fibromyalgia and RSD reports that she is on her 3rd dose of Wegovy 1mg which is injected by a nurse at the [facility] at her work. Currently, she is tolerating it well without any side effects or issues. Reports that her appetite is not as affected so still has cravings. She is working on diet and exercise in addition to the medication. She reported that she received a letter that suddenly stated Wegovy will no longer be covered by her insurance. Reports that she lost ~20 lbs while on Ozempic for 5-6 months until insurance stopped covering it. Weight returned afterwards.     Exercise: She typically walks around a lot at work and has been walking ~45 min every other night with .     Diet: still has craving/urge to eat. Still eating \"stuff she's not supposed to eat\" (spaghetti, burger, etc), but she is familiar with caloric deficit and actively trying to participate in a program. She is trying to eat more salads but doesn't like them.    Baseline weight: 156 lbs (3/4/24) --> 153 lbs (4/2/24) --> 152 lbs (4/16/24) --> 148.3 lbs (5/7/24)   BMI Readings from Last 5 Encounters:   05/07/24 33.27 kg/m²   08/11/23 32.96 kg/m²   05/10/23 39.51 kg/m²     Wt Readings from Last 5 Encounters:   05/07/24 67.3 kg (148 lb 4.8 oz)   08/11/23 " "66.7 kg (147 lb)   05/10/23 77.1 kg (170 lb)      Current medications:  Wegovy 1mg weekly on Tuesdays (4th dose today, 4/23-5/14)    Smoking: current smoker. Declined smoking cessation. May have interest in the future    Affordability/Accessibility: Wegovy is currently covered by insurance for $25 at [facility] or [facility]. Backorder may be an issue but is currently okay.  -Patient was suddenly informed by insurance that Wegovy will no longer be covered even though it's been effective for the past 3 months  -unable to afford even with coupon card and no insurance coverage  -Patient has Wegovy at [facility] for convenience but is interested in mail order [facility]. Other  pharmacies are not in network. She plans to retire in June and insurance may become Medicare, so she will need to find coverage for Wegovy or weight loss meds for treatment continuity.     Objective     There were no vitals taken for this visit.   BP Readings from Last 5 Encounters:   08/11/23 134/80   05/10/23 136/80       Labs  Lab Results   Component Value Date    BILITOT 0.6 05/22/2023    CALCIUM 9.5 05/22/2023    CO2 28 05/22/2023    CL 98 05/22/2023    CREATININE 0.87 05/22/2023    GLUCOSE 75 05/22/2023    ALKPHOS 88 05/22/2023    K 3.6 05/22/2023    PROT 7.5 05/22/2023     05/22/2023    AST 25 05/22/2023    ALT 15 05/22/2023    BUN 10 05/22/2023    ANIONGAP 15 05/22/2023    ALBUMIN 4.2 05/22/2023    GFRF 74 05/22/2023     Lab Results   Component Value Date    TRIG 145 05/22/2023    CHOL 158 05/22/2023    HDL 43.8 05/22/2023     No results found for: \"HGBA1C\"    Current Outpatient Medications on File Prior to Visit   Medication Sig Dispense Refill    semaglutide, weight loss, (Wegovy) 1 mg/0.5 mL pen injector Inject 1 mg under the skin 1 (one) time per week. 2 mL 1     No current facility-administered medications on file prior to visit.        Assessment/Plan   Problem List Items Addressed This Visit             ICD-10-CM    Weight loss R63.4 "     Patient is tolerating Wegovy and is on the 4th dose of 1mg today with no issues so far. She has implemented additional exercise with more walking and diet considerations and is satisfied with Wegovy's effect on her weight loss.   Continue: Wegovy 1mg once weekly   Working on PA.  Patient education: calorie deficit and types of food she can eat for weight loss sent to her email, cardiovascular benefits and how it's affected by obesity.  Noted that she may have had elevated BP, from previous visits but it is not regularly checked  Follow-up: 4 weeks for Wegovy titration and discuss medicare to ensure access to Wegovy for continuation of effective therapy          Relevant Orders    Follow Up In [facility] - Pharmacy     Sia Almonte, PharmD    Continue all meds under the continuation of care with the referring provider and clinical pharmacy team.

## 2024-05-15 NOTE — ASSESSMENT & PLAN NOTE
Patient is tolerating Wegovy and is on the 4th dose of 1mg today with no issues so far. She has implemented additional exercise with more walking and diet considerations and is satisfied with Wegovy's effect on her weight loss.   Continue: Wegovy 1mg once weekly   Working on PA.  Patient education: calorie deficit and types of food she can eat for weight loss sent to her email, cardiovascular benefits and how it's affected by obesity.  Noted that she may have had elevated BP, from previous visits but it is not regularly checked  Follow-up: 4 weeks for Wegovy titration and discuss medicare to ensure access to Wegovy for continuation of effective therapy

## 2024-05-21 ENCOUNTER — TELEMEDICINE (OUTPATIENT)
Dept: PHARMACY | Facility: HOSPITAL | Age: 64
End: 2024-05-21
Payer: COMMERCIAL

## 2024-05-21 DIAGNOSIS — R63.4 WEIGHT LOSS: ICD-10-CM

## 2024-05-21 NOTE — Clinical Note
Hi Dr. Og,  Unfortunately, the patient's insurance stopped covering Wegovy and does not cover any medication for weight loss. Her PA was denied, and she is unable to afford the out of pocket expenses even with the discount cards. We discussed non pharmacological methods to help with her weight loss. She is interested in trying phentermine, so I will defer that management to you. She will be changing her insurance in July and may be able to restart on a GLP-1 again though, so she has my number to call for assistance again if needed. Thanks!  Sia Almonte, PharmD

## 2024-05-21 NOTE — PROGRESS NOTES
"Subjective   Patient ID: Nancy Mercedes is a 63 y.o. female who presents for No chief complaint on file..    Referring Provider: Floyd Og DO     HPI    Review of Systems    Nancy Mercedes has been denied for prior authorization for Wegovy .     Key: [id number]  Reason for Denial: not covered under prescription insurance    Patient with fibromyalgia and RSD reports that she is on her 3rd dose of Wegovy 1mg which is injected by a nurse at the [facility] at her work. Currently, she is tolerating it well without any side effects or issues. Reports that her appetite is not as affected so still has cravings. She is working on diet and exercise in addition to the medication. She reported that she received a letter that suddenly stated Wegovy will no longer be covered by her insurance. Reports that she lost ~20 lbs while on Ozempic for 5-6 months until insurance stopped covering it. Weight returned afterwards.     Exercise: She typically walks around a lot at work and has been walking ~45 min every other night with .     Diet: still had craving/urge to eat. Still eating \"stuff she's not supposed to eat\" (spaghetti, burger, etc), but she is familiar with caloric deficit and actively trying to participate in a program. She is trying to eat more salads but doesn't like them.    Baseline weight: 156 lbs (3/4/24) --> 153 lbs (4/2/24) --> 152 lbs (4/16/24) --> 148.3 lbs (5/7/24)   BMI Readings from Last 5 Encounters:   05/07/24 33.27 kg/m²   08/11/23 32.96 kg/m²   05/10/23 39.51 kg/m²     Wt Readings from Last 5 Encounters:   05/07/24 67.3 kg (148 lb 4.8 oz)   08/11/23 66.7 kg (147 lb)   05/10/23 77.1 kg (170 lb)      Current medications:  Wegovy 1mg weekly on Tuesdays (4/23-5/14)    Smoking: current smoker. Declined smoking cessation. May have interest in the future    Affordability/Accessibility: Insurance stopped covering Wegovy after several months   -Patient was suddenly informed by insurance that " "Wegovy will no longer be covered even though it's been effective for the past 3 months  -unable to afford even with coupon card and no insurance coverage    Objective     There were no vitals taken for this visit.   BP Readings from Last 5 Encounters:   08/11/23 134/80   05/10/23 136/80       Labs  Lab Results   Component Value Date    BILITOT 0.6 05/22/2023    CALCIUM 9.5 05/22/2023    CO2 28 05/22/2023    CL 98 05/22/2023    CREATININE 0.87 05/22/2023    GLUCOSE 75 05/22/2023    ALKPHOS 88 05/22/2023    K 3.6 05/22/2023    PROT 7.5 05/22/2023     05/22/2023    AST 25 05/22/2023    ALT 15 05/22/2023    BUN 10 05/22/2023    ANIONGAP 15 05/22/2023    ALBUMIN 4.2 05/22/2023    GFRF 74 05/22/2023     Lab Results   Component Value Date    TRIG 145 05/22/2023    CHOL 158 05/22/2023    HDL 43.8 05/22/2023     No results found for: \"HGBA1C\"    Current Outpatient Medications on File Prior to Visit   Medication Sig Dispense Refill    semaglutide, weight loss, (Wegovy) 1 mg/0.5 mL pen injector Inject 1 mg under the skin 1 (one) time per week. 2 mL 1     No current facility-administered medications on file prior to visit.        Assessment/Plan   Problem List Items Addressed This Visit             ICD-10-CM    Weight loss R63.4     Wegovy was effective in the patient's weight loss and she tolerated it well. She has implemented additional exercise with more walking and diet considerations and is satisfied with Wegovy's effect on her weight loss. However, insurance stopped covering Wegovy, and she is unable to afford it with the coupon.   She may be interested in phentermine as the out of pocket cost is more affordable for her. Will defer this medication management to PCP.  She will likely benefit from nonpharmacological lifestyle modifications given her success with keto diet.   Discussed her TDEE and her specific calorie deficit  Discussed low impact effective exercises for weight loss given her back pain.   Discussed " correlation of weight loss from calorie deficit from portion control and GLP-1 MOA effect in portion cotnrol   Discussed insurance information for when she retires in July and obtains new insurance with coverage for Wegovy  Clinical Pharmacist follow up: as needed. Patient has my number at [phone]            Sia Almonte, PharmD    Continue all meds under the continuation of care with the referring provider and clinical pharmacy team.

## 2024-05-22 NOTE — ASSESSMENT & PLAN NOTE
Wegovy was effective in the patient's weight loss and she tolerated it well. She has implemented additional exercise with more walking and diet considerations and is satisfied with Wegovy's effect on her weight loss. However, insurance stopped covering Wegovy, and she is unable to afford it with the coupon.   She may be interested in phentermine as the out of pocket cost is more affordable for her. Will defer this medication management to PCP.  She will likely benefit from nonpharmacological lifestyle modifications given her success with keto diet.   Discussed her TDEE and her specific calorie deficit  Discussed low impact effective exercises for weight loss given her back pain.   Discussed correlation of weight loss from calorie deficit from portion control and GLP-1 MOA effect in portion cotnrol   Discussed insurance information for when she retires in July and obtains new insurance with coverage for Wegovy  Clinical Pharmacist follow up: as needed. Patient has my number at [phone]

## 2024-06-03 ENCOUNTER — TELEPHONE (OUTPATIENT)
Dept: FAMILY MEDICINE CLINIC | Age: 64
End: 2024-06-03

## 2024-06-03 DIAGNOSIS — D22.9 ATYPICAL MOLE: Primary | ICD-10-CM

## 2024-06-03 NOTE — TELEPHONE ENCOUNTER
Pt is requesting a referral for a dermatologist due to having a mole on her left side temple that she is worried about.

## 2024-06-21 SDOH — HEALTH STABILITY: PHYSICAL HEALTH: ON AVERAGE, HOW MANY DAYS PER WEEK DO YOU ENGAGE IN MODERATE TO STRENUOUS EXERCISE (LIKE A BRISK WALK)?: 3 DAYS

## 2024-06-21 SDOH — HEALTH STABILITY: PHYSICAL HEALTH: ON AVERAGE, HOW MANY MINUTES DO YOU ENGAGE IN EXERCISE AT THIS LEVEL?: 30 MIN

## 2024-06-24 ENCOUNTER — OFFICE VISIT (OUTPATIENT)
Dept: FAMILY MEDICINE CLINIC | Age: 64
End: 2024-06-24
Payer: COMMERCIAL

## 2024-06-24 VITALS — HEIGHT: 56 IN | WEIGHT: 160 LBS | TEMPERATURE: 97.8 F | BODY MASS INDEX: 35.99 KG/M2

## 2024-06-24 DIAGNOSIS — Z12.83 SKIN CANCER SCREENING: Primary | ICD-10-CM

## 2024-06-24 DIAGNOSIS — L82.1 SEBORRHEIC KERATOSES: ICD-10-CM

## 2024-06-24 PROCEDURE — 3017F COLORECTAL CA SCREEN DOC REV: CPT | Performed by: FAMILY MEDICINE

## 2024-06-24 PROCEDURE — 4004F PT TOBACCO SCREEN RCVD TLK: CPT | Performed by: FAMILY MEDICINE

## 2024-06-24 PROCEDURE — 99213 OFFICE O/P EST LOW 20 MIN: CPT | Performed by: FAMILY MEDICINE

## 2024-06-24 PROCEDURE — G8417 CALC BMI ABV UP PARAM F/U: HCPCS | Performed by: FAMILY MEDICINE

## 2024-06-24 PROCEDURE — G8427 DOCREV CUR MEDS BY ELIG CLIN: HCPCS | Performed by: FAMILY MEDICINE

## 2024-06-24 SDOH — ECONOMIC STABILITY: HOUSING INSECURITY
IN THE LAST 12 MONTHS, WAS THERE A TIME WHEN YOU DID NOT HAVE A STEADY PLACE TO SLEEP OR SLEPT IN A SHELTER (INCLUDING NOW)?: NO

## 2024-06-24 SDOH — ECONOMIC STABILITY: FOOD INSECURITY: WITHIN THE PAST 12 MONTHS, YOU WORRIED THAT YOUR FOOD WOULD RUN OUT BEFORE YOU GOT MONEY TO BUY MORE.: NEVER TRUE

## 2024-06-24 SDOH — ECONOMIC STABILITY: FOOD INSECURITY: WITHIN THE PAST 12 MONTHS, THE FOOD YOU BOUGHT JUST DIDN'T LAST AND YOU DIDN'T HAVE MONEY TO GET MORE.: NEVER TRUE

## 2024-06-24 SDOH — ECONOMIC STABILITY: INCOME INSECURITY: HOW HARD IS IT FOR YOU TO PAY FOR THE VERY BASICS LIKE FOOD, HOUSING, MEDICAL CARE, AND HEATING?: NOT HARD AT ALL

## 2024-06-24 ASSESSMENT — PATIENT HEALTH QUESTIONNAIRE - PHQ9
SUM OF ALL RESPONSES TO PHQ QUESTIONS 1-9: 0
SUM OF ALL RESPONSES TO PHQ QUESTIONS 1-9: 0
1. LITTLE INTEREST OR PLEASURE IN DOING THINGS: NOT AT ALL
SUM OF ALL RESPONSES TO PHQ QUESTIONS 1-9: 0
SUM OF ALL RESPONSES TO PHQ QUESTIONS 1-9: 0
2. FEELING DOWN, DEPRESSED OR HOPELESS: NOT AT ALL
SUM OF ALL RESPONSES TO PHQ9 QUESTIONS 1 & 2: 0

## 2024-06-24 ASSESSMENT — ENCOUNTER SYMPTOMS: COLOR CHANGE: 1

## 2024-06-24 NOTE — PATIENT INSTRUCTIONS
Discussed options for removal of lesion.  Noncancerous suspicion at this time.  Patient will think about and schedule in the future if desired

## 2024-06-24 NOTE — PROGRESS NOTES
Diagnosis Orders   1. Skin cancer screening        2. Seborrheic keratoses          Return in about 1 year (around 6/24/2025) for 15 min skin check.  Patient Instructions   Discussed options for removal of lesion.  Noncancerous suspicion at this time.  Patient will think about and schedule in the future if desired    Subjective:      Patient ID: Kaur Broderick is a 63 y.o. female who presents for:  Chief Complaint   Patient presents with    Skin Exam     Initial skin cancer screening   Concern: left side forehead        Patient has noted continued growth of the left forehead of brown lesion she would like to know if this is cancer or not and be screened for skin cancer in general        Current Outpatient Medications on File Prior to Visit   Medication Sig Dispense Refill    omeprazole (PRILOSEC) 40 MG delayed release capsule TAKE 1 CAPSULE BY MOUTH EVERY DAY 30 capsule 3    sodium-potassium-mag sulfate (SUPREP) 17.5-3.13-1.6 GM/177ML SOLN solution As directed 354 mL 0     No current facility-administered medications on file prior to visit.     Past Medical History:   Diagnosis Date    Asthma     Fibromyalgia     Hand fracture, left 2016    RSD (reflex sympathetic dystrophy)     Tobacco abuse      Past Surgical History:   Procedure Laterality Date    COLONOSCOPY N/A 4/6/2023    COLONOSCOPY DIAGNOSTIC performed by Ricardo Meraz MD at [facility]    UPPER GASTROINTESTINAL ENDOSCOPY N/A 4/6/2023    EGD DIAGNOSTIC ONLY performed by Ricardo Meraz MD at [facility]     Social History     Socioeconomic History    Marital status:      Spouse name: Not on file    Number of children: Not on file    Years of education: Not on file    Highest education level: Not on file   Occupational History    Not on file   Tobacco Use    Smoking status: Every Day     Current packs/day: 1.00     Average packs/day: 1 pack/day for 46.3 years (46.3 ttl pk-yrs)     Types: Cigarettes     Start date: 2/27/1978    Smokeless

## 2025-03-18 ENCOUNTER — PROCEDURE VISIT (OUTPATIENT)
Dept: FAMILY MEDICINE CLINIC | Age: 65
End: 2025-03-18
Payer: MEDICARE

## 2025-03-18 VITALS
WEIGHT: 170 LBS | HEIGHT: 56 IN | HEART RATE: 76 BPM | BODY MASS INDEX: 38.24 KG/M2 | OXYGEN SATURATION: 97 % | TEMPERATURE: 97.8 F

## 2025-03-18 DIAGNOSIS — L82.0 SEBORRHEIC KERATOSES, INFLAMED: Primary | ICD-10-CM

## 2025-03-18 PROCEDURE — 17110 DESTRUCTION B9 LES UP TO 14: CPT | Performed by: FAMILY MEDICINE

## 2025-03-18 SDOH — ECONOMIC STABILITY: FOOD INSECURITY: WITHIN THE PAST 12 MONTHS, YOU WORRIED THAT YOUR FOOD WOULD RUN OUT BEFORE YOU GOT MONEY TO BUY MORE.: NEVER TRUE

## 2025-03-18 SDOH — ECONOMIC STABILITY: FOOD INSECURITY: WITHIN THE PAST 12 MONTHS, THE FOOD YOU BOUGHT JUST DIDN'T LAST AND YOU DIDN'T HAVE MONEY TO GET MORE.: NEVER TRUE

## 2025-03-18 ASSESSMENT — PATIENT HEALTH QUESTIONNAIRE - PHQ9
SUM OF ALL RESPONSES TO PHQ QUESTIONS 1-9: 0
SUM OF ALL RESPONSES TO PHQ QUESTIONS 1-9: 0
1. LITTLE INTEREST OR PLEASURE IN DOING THINGS: NOT AT ALL
SUM OF ALL RESPONSES TO PHQ QUESTIONS 1-9: 0
SUM OF ALL RESPONSES TO PHQ QUESTIONS 1-9: 0
2. FEELING DOWN, DEPRESSED OR HOPELESS: NOT AT ALL

## 2025-03-18 NOTE — PROGRESS NOTES
Diagnosis Orders   1. Seborrheic keratoses, inflamed  DESTRUC BENIGN LESION, UP TO 14 LESIONS            Orders Placed This Encounter   Procedures    DESTRUC BENIGN LESION, UP TO 14 LESIONS       Kaur was seen today for procedure.    Diagnoses and all orders for this visit:    Seborrheic keratoses, inflamed  -     DESTRUC BENIGN LESION, UP TO 14 LESIONS        Return in about 3 weeks (around 4/8/2025) for for review of outcome of today's recommendation.    Patient Instructions   Due to location reviewed two-step procedure with patient regarding removal of superficial tissue and then treating the base with liquid nitrogen therapy which may need to be repeated in the future.  Patient agreed to this technique for removal of her left temple lesion.    Inflamed seborrheic keratosis was removed by shave followed with liquid nitrogen therapy.    Incision/Laceration repair    -Clean surgical area with antibacterial soap and water once daily.      --You may be instructed to soak the wound with Hydrogen Peroxide to loosen scabbing around sutures, this is not to be done more often that every 3 days, should be for 30 seconds-1 min and then rinsed off with water.     -Keep surgical site moist with vaseline or antibiotic ointment (single- Bacitracin, not triple antibiotic or Neosporin) and apply a fresh bandage daily until a solid scab forms or if the wound is at risk for trauma or dirt. It is important to leave the wound uncovered part of the day to allow the skin to dry and avoid breakdown from excessive moisture. There may be exceptions to this, the staff will provide information if your wound requires a variation in this, that will often involve a prescription ointment or cream.     -Follow up immediately if any growing redness (minimal redness or pale pink is normal along wound edges) surrounds the surgical site or if dripping drainage occurs at surgical site. Once a solid scab forms no more bandage needed. A wet scab

## 2025-04-09 ENCOUNTER — OFFICE VISIT (OUTPATIENT)
Dept: FAMILY MEDICINE CLINIC | Age: 65
End: 2025-04-09
Payer: MEDICARE

## 2025-04-09 VITALS — HEIGHT: 56 IN | TEMPERATURE: 97 F | BODY MASS INDEX: 38.33 KG/M2 | WEIGHT: 170.4 LBS

## 2025-04-09 DIAGNOSIS — L82.1 SEBORRHEIC KERATOSES: Primary | ICD-10-CM

## 2025-04-09 PROCEDURE — 17110 DESTRUCTION B9 LES UP TO 14: CPT | Performed by: FAMILY MEDICINE

## 2025-04-09 ASSESSMENT — ENCOUNTER SYMPTOMS: COLOR CHANGE: 1

## 2025-04-09 NOTE — PROGRESS NOTES
Diagnosis Orders   1. Seborrheic keratoses  DESTRUC BENIGN LESION, UP TO 14 LESIONS          Return in about 1 year (around 4/9/2026) for 15 min skin check.    Orders Placed This Encounter   Procedures    DESTRUC BENIGN LESION, UP TO 14 LESIONS       Kaur was seen today for skin exam.    Diagnoses and all orders for this visit:    Seborrheic keratoses  -     DESTRUC BENIGN LESION, UP TO 14 LESIONS        Return in about 1 year (around 4/9/2026) for 15 min skin check.    Patient Instructions   Cryotherapy instructions    Post op instructions given. A printed copy provided.    It is best to leave blisters alone if they form for the first 1-3 days to allow the desired damaged tissue (precancer lesion, wart, or whatever lesion is being removed) to separate from healthy tissue.     The area should be covered with a bandage to prevent blister breakage and dirt exposure.      The wounds should remain dry while there is a blister, therefore if this is a sweaty location, like the foot ,you may need to change clothing, such as socks, multiple times per day.       Remember that the reason for cryosurgery to be done is to damage skin that is not normal so that it will be removed.  Therefore the area could be red or pink, can sting or burn for the first day or 2, will appear raw when the skin sloughs off.    When the blister(s) pop or patient removes the top as instructed between day 3-5, apply antibiotic (NOT triple antibiotic, one brand is Neosporin) ointment, usually Bacitracin, and a bandage to affected area(s).  The ointment should be applied to the open area as long as it is not covered with skin.  Exposed tissue is meant to be moist.      Once a scab is formed the patient may stop applying ointment.  The scab may appear yellow while moist, don't confuse this with infection.  If the wound is infection pus will drain from the site. If this treatment was for a large wart you may note that a plug of skin may fall out of

## 2025-07-09 ENCOUNTER — OFFICE VISIT (OUTPATIENT)
Age: 65
End: 2025-07-09
Payer: MEDICARE

## 2025-07-09 VITALS
WEIGHT: 171.6 LBS | HEIGHT: 56 IN | DIASTOLIC BLOOD PRESSURE: 82 MMHG | HEART RATE: 89 BPM | OXYGEN SATURATION: 96 % | SYSTOLIC BLOOD PRESSURE: 130 MMHG | TEMPERATURE: 97.5 F | BODY MASS INDEX: 38.6 KG/M2

## 2025-07-09 DIAGNOSIS — K21.9 CHRONIC GERD WITHOUT ESOPHAGITIS: Primary | ICD-10-CM

## 2025-07-09 DIAGNOSIS — R05.3 CHRONIC COUGH: ICD-10-CM

## 2025-07-09 DIAGNOSIS — J44.9 CHRONIC OBSTRUCTIVE PULMONARY DISEASE, UNSPECIFIED COPD TYPE (HCC): ICD-10-CM

## 2025-07-09 DIAGNOSIS — J41.0 SIMPLE CHRONIC BRONCHITIS (HCC): ICD-10-CM

## 2025-07-09 DIAGNOSIS — K44.9 HIATAL HERNIA: ICD-10-CM

## 2025-07-09 DIAGNOSIS — R13.19 ESOPHAGEAL DYSPHAGIA: ICD-10-CM

## 2025-07-09 DIAGNOSIS — H92.02 POSTERIOR AURICULAR PAIN OF LEFT EAR: ICD-10-CM

## 2025-07-09 PROCEDURE — 1123F ACP DISCUSS/DSCN MKR DOCD: CPT | Performed by: FAMILY MEDICINE

## 2025-07-09 PROCEDURE — 99214 OFFICE O/P EST MOD 30 MIN: CPT | Performed by: FAMILY MEDICINE

## 2025-07-09 RX ORDER — SUCRALFATE 1 G/1
1 TABLET ORAL 4 TIMES DAILY
Qty: 40 TABLET | Refills: 3 | Status: SHIPPED | OUTPATIENT
Start: 2025-07-09

## 2025-07-09 RX ORDER — FAMOTIDINE 40 MG/1
40 TABLET, FILM COATED ORAL EVERY EVENING
Qty: 30 TABLET | Refills: 3 | Status: SHIPPED | OUTPATIENT
Start: 2025-07-09

## 2025-07-09 RX ORDER — FLUTICASONE FUROATE, UMECLIDINIUM BROMIDE AND VILANTEROL TRIFENATATE 100; 62.5; 25 UG/1; UG/1; UG/1
1 POWDER RESPIRATORY (INHALATION) DAILY
Qty: 1 EACH | Refills: 3 | Status: SHIPPED | OUTPATIENT
Start: 2025-07-09

## 2025-07-09 RX ORDER — PANTOPRAZOLE SODIUM 40 MG/1
40 TABLET, DELAYED RELEASE ORAL
Qty: 90 TABLET | Refills: 1 | Status: SHIPPED | OUTPATIENT
Start: 2025-07-09

## 2025-07-09 NOTE — PROGRESS NOTES
Kaur Broderick (:  1960) is a 64 y.o. female, New patient, here for evaluation of the following chief complaint(s):  Gastroesophageal Reflux (C/O acid reflux and pain in her epigastrium upper chest. She does vomit as well. This does not happen every time she eats, but is becoming more frequent. She has been taking Nexium and an OTC acid reducer for the past month which has helped minimize her symptoms. ), Ear Pain (C/O intermittent left ear pain for the last year. States she has to take her glasses off and cannot even touch her ear when the pain starts. The pain lasts for varying lengths of time. ), and Cough (C/O chronic productive cough with thick, white sputum. States this has been ongoing for years. She had some tests a few years back, but has never followed up about it. She is a current smoker. No hx low dose CT.)          Subjective   History of Present Illness  The patient presents for evaluation of reflux, cough, ear pain, and RSD.    She experiences a burning sensation during meals, which is often followed by regurgitation of chewed food. This issue has been ongoing for some time, with a particularly severe episode last month. However, the frequency of these episodes has decreased this month. She has informed Dr. Reese about this condition but has not been prescribed any medication for it. Several imaging tests and an EGD were performed, revealing a 4 cm hiatal hernia and erosion in the stomach. The biopsy showed gastritis and an early ulcer, but no Stover's esophagus. She reports that once food reaches her stomach, she feels fine, but the discomfort arises when the food seems to get stuck and comes back up. She does not remember if she was prescribed acid medicine after the EGD. She was not informed about a follow-up scope.    She also reports ear pain, which is intermittent and primarily affects her left ear. She does not experience any muffled hearing. The pain is infrequent, occurring a

## 2025-08-06 ENCOUNTER — OFFICE VISIT (OUTPATIENT)
Age: 65
End: 2025-08-06
Payer: MEDICARE

## 2025-08-06 VITALS
WEIGHT: 178 LBS | SYSTOLIC BLOOD PRESSURE: 120 MMHG | HEIGHT: 56 IN | TEMPERATURE: 98 F | HEART RATE: 79 BPM | DIASTOLIC BLOOD PRESSURE: 80 MMHG | OXYGEN SATURATION: 96 % | BODY MASS INDEX: 40.04 KG/M2

## 2025-08-06 DIAGNOSIS — Z87.891 PERSONAL HISTORY OF TOBACCO USE: ICD-10-CM

## 2025-08-06 DIAGNOSIS — Z00.00 WELCOME TO MEDICARE PREVENTIVE VISIT: Primary | ICD-10-CM

## 2025-08-06 DIAGNOSIS — I10 HYPERTENSION, UNSPECIFIED TYPE: ICD-10-CM

## 2025-08-06 DIAGNOSIS — E66.813 CLASS 3 OBESITY (HCC): ICD-10-CM

## 2025-08-06 DIAGNOSIS — E78.2 MIXED HYPERLIPIDEMIA: ICD-10-CM

## 2025-08-06 PROCEDURE — 1160F RVW MEDS BY RX/DR IN RCRD: CPT | Performed by: FAMILY MEDICINE

## 2025-08-06 PROCEDURE — 1159F MED LIST DOCD IN RCRD: CPT | Performed by: FAMILY MEDICINE

## 2025-08-06 PROCEDURE — 3074F SYST BP LT 130 MM HG: CPT | Performed by: FAMILY MEDICINE

## 2025-08-06 PROCEDURE — G0296 VISIT TO DETERM LDCT ELIG: HCPCS | Performed by: FAMILY MEDICINE

## 2025-08-06 PROCEDURE — G0402 INITIAL PREVENTIVE EXAM: HCPCS | Performed by: FAMILY MEDICINE

## 2025-08-06 PROCEDURE — 3079F DIAST BP 80-89 MM HG: CPT | Performed by: FAMILY MEDICINE

## 2025-08-06 PROCEDURE — 99214 OFFICE O/P EST MOD 30 MIN: CPT | Performed by: FAMILY MEDICINE

## 2025-08-06 PROCEDURE — 1123F ACP DISCUSS/DSCN MKR DOCD: CPT | Performed by: FAMILY MEDICINE

## 2025-08-06 RX ORDER — PHENTERMINE HYDROCHLORIDE 37.5 MG/1
37.5 TABLET ORAL
Qty: 30 TABLET | Refills: 0 | Status: SHIPPED | OUTPATIENT
Start: 2025-08-06 | End: 2025-09-05

## 2025-08-06 ASSESSMENT — PATIENT HEALTH QUESTIONNAIRE - PHQ9
SUM OF ALL RESPONSES TO PHQ QUESTIONS 1-9: 0
DEPRESSION UNABLE TO ASSESS: PT REFUSES
SUM OF ALL RESPONSES TO PHQ QUESTIONS 1-9: 0
SUM OF ALL RESPONSES TO PHQ QUESTIONS 1-9: 0
1. LITTLE INTEREST OR PLEASURE IN DOING THINGS: NOT AT ALL
2. FEELING DOWN, DEPRESSED OR HOPELESS: NOT AT ALL
SUM OF ALL RESPONSES TO PHQ QUESTIONS 1-9: 0

## 2025-08-06 ASSESSMENT — LIFESTYLE VARIABLES
HOW OFTEN DO YOU HAVE A DRINK CONTAINING ALCOHOL: 2-4 TIMES A MONTH
HOW MANY STANDARD DRINKS CONTAINING ALCOHOL DO YOU HAVE ON A TYPICAL DAY: 1 OR 2

## 2025-08-07 ENCOUNTER — TELEPHONE (OUTPATIENT)
Age: 65
End: 2025-08-07

## (undated) DEVICE — TRAP POLYP BALEEN

## (undated) DEVICE — FORCEPS BX L240CM JAW DIA2.8MM L CAP W/ NDL MIC MESH TOOTH

## (undated) DEVICE — TUBE SET 96 MM 64 MM H2O PERISTALTIC STD AUX CHANNEL

## (undated) DEVICE — Device: Brand: OLYMPUS

## (undated) DEVICE — CONMED SCOPE SAVER BITE BLOCK, 20X27 MM: Brand: SCOPE SAVER

## (undated) DEVICE — GLOVE ORANGE PI 8   MSG9080

## (undated) DEVICE — BRUSH ENDO CLN L90.5IN SHTH DIA1.7MM BRIST DIA5-7MM 2-6MM

## (undated) DEVICE — ENDO CARRY-ON PROCEDURE KIT: Brand: ENDO CARRY-ON PROCEDURE KIT

## (undated) DEVICE — Device: Brand: ENDO SMARTCAP

## (undated) DEVICE — TUBING, SUCTION, 1/4" X 10', STRAIGHT: Brand: MEDLINE

## (undated) DEVICE — GLOVE ORTHO 8   MSG9480

## (undated) DEVICE — SNARE ENDOSCP AD L240CM LOOP W10MM SHTH DIA2.4MM RND INSUL

## (undated) DEVICE — SINGLE PORT MANIFOLD: Brand: NEPTUNE 2